# Patient Record
Sex: FEMALE | Race: WHITE | Employment: FULL TIME | URBAN - METROPOLITAN AREA
[De-identification: names, ages, dates, MRNs, and addresses within clinical notes are randomized per-mention and may not be internally consistent; named-entity substitution may affect disease eponyms.]

---

## 2017-06-27 LAB
A/G RATIO: 1.2 (ref 1.1–2.2)
ALBUMIN SERPL-MCNC: 3.8 G/DL (ref 3.4–5)
ALP BLD-CCNC: 79 U/L (ref 40–129)
ALT SERPL-CCNC: 17 U/L (ref 10–40)
ANION GAP SERPL CALCULATED.3IONS-SCNC: 16 MMOL/L (ref 3–16)
AST SERPL-CCNC: 18 U/L (ref 15–37)
BILIRUB SERPL-MCNC: <0.2 MG/DL (ref 0–1)
BUN BLDV-MCNC: 11 MG/DL (ref 7–20)
CALCIUM SERPL-MCNC: 9.5 MG/DL (ref 8.3–10.6)
CHLORIDE BLD-SCNC: 103 MMOL/L (ref 99–110)
CHOLESTEROL, TOTAL: 170 MG/DL (ref 0–199)
CO2: 23 MMOL/L (ref 21–32)
CREAT SERPL-MCNC: 0.8 MG/DL (ref 0.6–1.1)
GFR AFRICAN AMERICAN: >60
GFR NON-AFRICAN AMERICAN: >60
GLOBULIN: 3.2 G/DL
GLUCOSE BLD-MCNC: 88 MG/DL (ref 70–99)
HDLC SERPL-MCNC: 55 MG/DL (ref 40–60)
LDL CHOLESTEROL CALCULATED: 101 MG/DL
POTASSIUM SERPL-SCNC: 4.9 MMOL/L (ref 3.5–5.1)
SODIUM BLD-SCNC: 142 MMOL/L (ref 136–145)
TOTAL PROTEIN: 7 G/DL (ref 6.4–8.2)
TRIGL SERPL-MCNC: 71 MG/DL (ref 0–150)
TSH SERPL DL<=0.05 MIU/L-ACNC: 8.01 UIU/ML (ref 0.27–4.2)
VITAMIN D 25-HYDROXY: 15.6 NG/ML
VLDLC SERPL CALC-MCNC: 14 MG/DL

## 2017-06-28 LAB
ESTIMATED AVERAGE GLUCOSE: 102.5 MG/DL
HBA1C MFR BLD: 5.2 %

## 2017-08-08 ENCOUNTER — OFFICE VISIT (OUTPATIENT)
Dept: FAMILY MEDICINE CLINIC | Age: 27
End: 2017-08-08

## 2017-08-08 VITALS
HEIGHT: 67 IN | WEIGHT: 293 LBS | SYSTOLIC BLOOD PRESSURE: 116 MMHG | DIASTOLIC BLOOD PRESSURE: 74 MMHG | HEART RATE: 84 BPM | OXYGEN SATURATION: 98 % | RESPIRATION RATE: 14 BRPM | BODY MASS INDEX: 45.99 KG/M2

## 2017-08-08 DIAGNOSIS — E66.01 OBESITY, MORBID, BMI 50 OR HIGHER (HCC): ICD-10-CM

## 2017-08-08 DIAGNOSIS — Z01.818 PRE-OPERATIVE CLEARANCE: Primary | ICD-10-CM

## 2017-08-08 DIAGNOSIS — N92.1 MENORRHAGIA WITH IRREGULAR CYCLE: ICD-10-CM

## 2017-08-08 DIAGNOSIS — F41.9 ANXIETY: ICD-10-CM

## 2017-08-08 DIAGNOSIS — E03.9 ACQUIRED HYPOTHYROIDISM: ICD-10-CM

## 2017-08-08 PROCEDURE — 99204 OFFICE O/P NEW MOD 45 MIN: CPT | Performed by: FAMILY MEDICINE

## 2017-08-08 RX ORDER — BUSPIRONE HYDROCHLORIDE 10 MG/1
10 TABLET ORAL 3 TIMES DAILY
Qty: 90 TABLET | Refills: 0 | Status: SHIPPED | OUTPATIENT
Start: 2017-08-08 | End: 2017-09-26

## 2017-08-08 ASSESSMENT — PATIENT HEALTH QUESTIONNAIRE - PHQ9
SUM OF ALL RESPONSES TO PHQ QUESTIONS 1-9: 4
SUM OF ALL RESPONSES TO PHQ9 QUESTIONS 1 & 2: 4
1. LITTLE INTEREST OR PLEASURE IN DOING THINGS: 1
2. FEELING DOWN, DEPRESSED OR HOPELESS: 3

## 2017-08-14 DIAGNOSIS — E03.9 ACQUIRED HYPOTHYROIDISM: ICD-10-CM

## 2017-08-14 DIAGNOSIS — N92.1 MENORRHAGIA WITH IRREGULAR CYCLE: ICD-10-CM

## 2017-08-14 LAB
BASOPHILS ABSOLUTE: 0.1 K/UL (ref 0–0.2)
BASOPHILS RELATIVE PERCENT: 0.7 %
EOSINOPHILS ABSOLUTE: 0.2 K/UL (ref 0–0.6)
EOSINOPHILS RELATIVE PERCENT: 3.1 %
HCT VFR BLD CALC: 35.2 % (ref 36–48)
HEMOGLOBIN: 11.5 G/DL (ref 12–16)
LYMPHOCYTES ABSOLUTE: 2 K/UL (ref 1–5.1)
LYMPHOCYTES RELATIVE PERCENT: 26.7 %
MCH RBC QN AUTO: 28 PG (ref 26–34)
MCHC RBC AUTO-ENTMCNC: 32.7 G/DL (ref 31–36)
MCV RBC AUTO: 85.6 FL (ref 80–100)
MONOCYTES ABSOLUTE: 0.4 K/UL (ref 0–1.3)
MONOCYTES RELATIVE PERCENT: 5.7 %
NEUTROPHILS ABSOLUTE: 4.7 K/UL (ref 1.7–7.7)
NEUTROPHILS RELATIVE PERCENT: 63.8 %
PDW BLD-RTO: 14.3 % (ref 12.4–15.4)
PLATELET # BLD: 257 K/UL (ref 135–450)
PMV BLD AUTO: 8.9 FL (ref 5–10.5)
RBC # BLD: 4.11 M/UL (ref 4–5.2)
TSH SERPL DL<=0.05 MIU/L-ACNC: 3.31 UIU/ML (ref 0.27–4.2)
WBC # BLD: 7.4 K/UL (ref 4–11)

## 2017-08-18 ENCOUNTER — HOSPITAL ENCOUNTER (OUTPATIENT)
Dept: SURGERY | Age: 27
Discharge: HOME | End: 2017-08-18
Attending: OBSTETRICS & GYNECOLOGY | Admitting: OBSTETRICS & GYNECOLOGY

## 2017-08-18 VITALS
TEMPERATURE: 97.4 F | HEART RATE: 71 BPM | OXYGEN SATURATION: 97 % | SYSTOLIC BLOOD PRESSURE: 158 MMHG | RESPIRATION RATE: 14 BRPM | HEIGHT: 67 IN | WEIGHT: 293 LBS | BODY MASS INDEX: 45.99 KG/M2 | DIASTOLIC BLOOD PRESSURE: 85 MMHG

## 2017-08-18 DIAGNOSIS — N93.9 ABNORMAL UTERINE BLEEDING: ICD-10-CM

## 2017-08-18 LAB
A/G RATIO: 1.3 (ref 1.1–2.2)
ALBUMIN SERPL-MCNC: 4 G/DL (ref 3.4–5)
ALP BLD-CCNC: 76 U/L (ref 40–129)
ALT SERPL-CCNC: 15 U/L (ref 10–40)
ANION GAP SERPL CALCULATED.3IONS-SCNC: 12 MMOL/L (ref 3–16)
AST SERPL-CCNC: 15 U/L (ref 15–37)
BILIRUB SERPL-MCNC: <0.2 MG/DL (ref 0–1)
BUN BLDV-MCNC: 11 MG/DL (ref 7–20)
CALCIUM SERPL-MCNC: 8.6 MG/DL (ref 8.3–10.6)
CHLORIDE BLD-SCNC: 107 MMOL/L (ref 99–110)
CO2: 22 MMOL/L (ref 21–32)
CREAT SERPL-MCNC: 0.8 MG/DL (ref 0.6–1.1)
EKG ATRIAL RATE: 65 BPM
EKG DIAGNOSIS: NORMAL
EKG P AXIS: 7 DEGREES
EKG P-R INTERVAL: 164 MS
EKG Q-T INTERVAL: 412 MS
EKG QRS DURATION: 96 MS
EKG QTC CALCULATION (BAZETT): 428 MS
EKG R AXIS: 63 DEGREES
EKG T AXIS: 37 DEGREES
EKG VENTRICULAR RATE: 65 BPM
GFR AFRICAN AMERICAN: >60
GFR NON-AFRICAN AMERICAN: >60
GLOBULIN: 3 G/DL
GLUCOSE BLD-MCNC: 91 MG/DL (ref 70–99)
HCG(URINE) PREGNANCY TEST: NEGATIVE
POTASSIUM SERPL-SCNC: 4 MMOL/L (ref 3.5–5.1)
SODIUM BLD-SCNC: 141 MMOL/L (ref 136–145)
TOTAL PROTEIN: 7 G/DL (ref 6.4–8.2)

## 2017-08-18 PROCEDURE — 93010 ELECTROCARDIOGRAM REPORT: CPT | Performed by: INTERNAL MEDICINE

## 2017-08-18 RX ORDER — LIDOCAINE HYDROCHLORIDE 10 MG/ML
0.5 INJECTION, SOLUTION EPIDURAL; INFILTRATION; INTRACAUDAL; PERINEURAL ONCE
Status: DISCONTINUED | OUTPATIENT
Start: 2017-08-18 | End: 2017-08-19 | Stop reason: HOSPADM

## 2017-08-18 RX ORDER — ONDANSETRON 2 MG/ML
4 INJECTION INTRAMUSCULAR; INTRAVENOUS
Status: ACTIVE | OUTPATIENT
Start: 2017-08-18 | End: 2017-08-18

## 2017-08-18 RX ORDER — SODIUM CHLORIDE 0.9 % (FLUSH) 0.9 %
10 SYRINGE (ML) INJECTION EVERY 12 HOURS SCHEDULED
Status: DISCONTINUED | OUTPATIENT
Start: 2017-08-18 | End: 2017-08-19 | Stop reason: HOSPADM

## 2017-08-18 RX ORDER — LABETALOL HYDROCHLORIDE 5 MG/ML
5 INJECTION, SOLUTION INTRAVENOUS EVERY 10 MIN PRN
Status: DISCONTINUED | OUTPATIENT
Start: 2017-08-18 | End: 2017-08-19 | Stop reason: HOSPADM

## 2017-08-18 RX ORDER — FENTANYL CITRATE 50 UG/ML
25 INJECTION, SOLUTION INTRAMUSCULAR; INTRAVENOUS EVERY 5 MIN PRN
Status: DISCONTINUED | OUTPATIENT
Start: 2017-08-18 | End: 2017-08-19 | Stop reason: HOSPADM

## 2017-08-18 RX ORDER — HYDROMORPHONE HCL 110MG/55ML
0.25 PATIENT CONTROLLED ANALGESIA SYRINGE INTRAVENOUS EVERY 5 MIN PRN
Status: DISCONTINUED | OUTPATIENT
Start: 2017-08-18 | End: 2017-08-19 | Stop reason: HOSPADM

## 2017-08-18 RX ORDER — LIDOCAINE HYDROCHLORIDE 10 MG/ML
1 INJECTION, SOLUTION EPIDURAL; INFILTRATION; INTRACAUDAL; PERINEURAL
Status: ACTIVE | OUTPATIENT
Start: 2017-08-18 | End: 2017-08-18

## 2017-08-18 RX ORDER — FENTANYL CITRATE 50 UG/ML
50 INJECTION, SOLUTION INTRAMUSCULAR; INTRAVENOUS EVERY 5 MIN PRN
Status: DISCONTINUED | OUTPATIENT
Start: 2017-08-18 | End: 2017-08-19 | Stop reason: HOSPADM

## 2017-08-18 RX ORDER — KETOROLAC TROMETHAMINE 30 MG/ML
INJECTION, SOLUTION INTRAMUSCULAR; INTRAVENOUS
Status: COMPLETED
Start: 2017-08-18 | End: 2017-08-18

## 2017-08-18 RX ORDER — OXYCODONE HYDROCHLORIDE AND ACETAMINOPHEN 5; 325 MG/1; MG/1
1 TABLET ORAL PRN
Status: ACTIVE | OUTPATIENT
Start: 2017-08-18 | End: 2017-08-18

## 2017-08-18 RX ORDER — KETOROLAC TROMETHAMINE 30 MG/ML
30 INJECTION, SOLUTION INTRAMUSCULAR; INTRAVENOUS ONCE
Status: COMPLETED | OUTPATIENT
Start: 2017-08-18 | End: 2017-08-18

## 2017-08-18 RX ORDER — OXYCODONE HYDROCHLORIDE AND ACETAMINOPHEN 5; 325 MG/1; MG/1
2 TABLET ORAL PRN
Status: ACTIVE | OUTPATIENT
Start: 2017-08-18 | End: 2017-08-18

## 2017-08-18 RX ORDER — SODIUM CHLORIDE 9 MG/ML
INJECTION, SOLUTION INTRAVENOUS CONTINUOUS
Status: DISCONTINUED | OUTPATIENT
Start: 2017-08-18 | End: 2017-08-19 | Stop reason: HOSPADM

## 2017-08-18 RX ORDER — IBUPROFEN 600 MG/1
600 TABLET ORAL EVERY 6 HOURS PRN
Qty: 30 TABLET | Refills: 1 | Status: SHIPPED | OUTPATIENT
Start: 2017-08-18 | End: 2020-05-19

## 2017-08-18 RX ORDER — SODIUM CHLORIDE, SODIUM LACTATE, POTASSIUM CHLORIDE, CALCIUM CHLORIDE 600; 310; 30; 20 MG/100ML; MG/100ML; MG/100ML; MG/100ML
INJECTION, SOLUTION INTRAVENOUS CONTINUOUS
Status: DISCONTINUED | OUTPATIENT
Start: 2017-08-18 | End: 2017-08-19 | Stop reason: HOSPADM

## 2017-08-18 RX ORDER — HYDROMORPHONE HCL 110MG/55ML
0.5 PATIENT CONTROLLED ANALGESIA SYRINGE INTRAVENOUS EVERY 5 MIN PRN
Status: DISCONTINUED | OUTPATIENT
Start: 2017-08-18 | End: 2017-08-19 | Stop reason: HOSPADM

## 2017-08-18 RX ORDER — MEPERIDINE HYDROCHLORIDE 25 MG/ML
12.5 INJECTION INTRAMUSCULAR; INTRAVENOUS; SUBCUTANEOUS EVERY 5 MIN PRN
Status: DISCONTINUED | OUTPATIENT
Start: 2017-08-18 | End: 2017-08-19 | Stop reason: HOSPADM

## 2017-08-18 RX ORDER — SODIUM CHLORIDE 0.9 % (FLUSH) 0.9 %
10 SYRINGE (ML) INJECTION PRN
Status: DISCONTINUED | OUTPATIENT
Start: 2017-08-18 | End: 2017-08-19 | Stop reason: HOSPADM

## 2017-08-18 RX ADMIN — KETOROLAC TROMETHAMINE 30 MG: 30 INJECTION, SOLUTION INTRAMUSCULAR; INTRAVENOUS at 10:50

## 2017-08-18 RX ADMIN — SODIUM CHLORIDE, SODIUM LACTATE, POTASSIUM CHLORIDE, CALCIUM CHLORIDE: 600; 310; 30; 20 INJECTION, SOLUTION INTRAVENOUS at 08:49

## 2017-08-18 RX ADMIN — SODIUM CHLORIDE, SODIUM LACTATE, POTASSIUM CHLORIDE, CALCIUM CHLORIDE: 600; 310; 30; 20 INJECTION, SOLUTION INTRAVENOUS at 10:47

## 2017-08-18 RX ADMIN — FENTANYL CITRATE 50 MCG: 50 INJECTION, SOLUTION INTRAMUSCULAR; INTRAVENOUS at 10:40

## 2017-08-18 RX ADMIN — FENTANYL CITRATE 50 MCG: 50 INJECTION, SOLUTION INTRAMUSCULAR; INTRAVENOUS at 10:30

## 2017-08-18 ASSESSMENT — PAIN SCALES - GENERAL
PAINLEVEL_OUTOF10: 8
PAINLEVEL_OUTOF10: 8
PAINLEVEL_OUTOF10: 1

## 2017-08-18 ASSESSMENT — PAIN DESCRIPTION - PAIN TYPE
TYPE: SURGICAL PAIN

## 2017-08-18 ASSESSMENT — PAIN - FUNCTIONAL ASSESSMENT: PAIN_FUNCTIONAL_ASSESSMENT: 0-10

## 2017-09-26 ENCOUNTER — OFFICE VISIT (OUTPATIENT)
Dept: FAMILY MEDICINE CLINIC | Age: 27
End: 2017-09-26

## 2017-09-26 VITALS
BODY MASS INDEX: 45.99 KG/M2 | WEIGHT: 293 LBS | RESPIRATION RATE: 14 BRPM | HEART RATE: 82 BPM | DIASTOLIC BLOOD PRESSURE: 66 MMHG | HEIGHT: 67 IN | SYSTOLIC BLOOD PRESSURE: 112 MMHG | OXYGEN SATURATION: 98 %

## 2017-09-26 DIAGNOSIS — E03.9 ACQUIRED HYPOTHYROIDISM: ICD-10-CM

## 2017-09-26 DIAGNOSIS — F41.9 ANXIETY: Primary | ICD-10-CM

## 2017-09-26 PROCEDURE — 99213 OFFICE O/P EST LOW 20 MIN: CPT | Performed by: FAMILY MEDICINE

## 2017-09-26 RX ORDER — VENLAFAXINE HYDROCHLORIDE 37.5 MG/1
37.5 CAPSULE, EXTENDED RELEASE ORAL DAILY
Qty: 30 CAPSULE | Refills: 3 | Status: SHIPPED | OUTPATIENT
Start: 2017-09-26 | End: 2020-05-19

## 2017-09-26 RX ORDER — LEVOTHYROXINE SODIUM 0.05 MG/1
50 TABLET ORAL DAILY
Qty: 90 TABLET | Refills: 3 | Status: SHIPPED | OUTPATIENT
Start: 2017-09-26 | End: 2020-05-19

## 2017-11-03 ENCOUNTER — OFFICE VISIT (OUTPATIENT)
Dept: FAMILY MEDICINE CLINIC | Age: 27
End: 2017-11-03

## 2017-11-03 VITALS
HEART RATE: 97 BPM | WEIGHT: 293 LBS | OXYGEN SATURATION: 98 % | BODY MASS INDEX: 45.99 KG/M2 | HEIGHT: 67 IN | SYSTOLIC BLOOD PRESSURE: 124 MMHG | RESPIRATION RATE: 14 BRPM | DIASTOLIC BLOOD PRESSURE: 84 MMHG

## 2017-11-03 DIAGNOSIS — N39.3 STRESS INCONTINENCE OF URINE: Primary | ICD-10-CM

## 2017-11-03 DIAGNOSIS — J06.9 VIRAL URI WITH COUGH: ICD-10-CM

## 2017-11-03 DIAGNOSIS — R10.32 LLQ CRAMPING: ICD-10-CM

## 2017-11-03 PROBLEM — Z83.49 FAMILY HISTORY OF CYSTIC FIBROSIS: Status: ACTIVE | Noted: 2017-11-03

## 2017-11-03 LAB
BILIRUBIN, POC: NORMAL
BLOOD URINE, POC: NORMAL
CLARITY, POC: CLEAR
COLOR, POC: YELLOW
GLUCOSE URINE, POC: NORMAL
KETONES, POC: NORMAL
LEUKOCYTE EST, POC: NORMAL
NITRITE, POC: NORMAL
PH, POC: 6
PROTEIN, POC: NORMAL
SPECIFIC GRAVITY, POC: 1.03
UROBILINOGEN, POC: 0.2

## 2017-11-03 PROCEDURE — G8484 FLU IMMUNIZE NO ADMIN: HCPCS | Performed by: FAMILY MEDICINE

## 2017-11-03 PROCEDURE — G8427 DOCREV CUR MEDS BY ELIG CLIN: HCPCS | Performed by: FAMILY MEDICINE

## 2017-11-03 PROCEDURE — 4004F PT TOBACCO SCREEN RCVD TLK: CPT | Performed by: FAMILY MEDICINE

## 2017-11-03 PROCEDURE — G8417 CALC BMI ABV UP PARAM F/U: HCPCS | Performed by: FAMILY MEDICINE

## 2017-11-03 PROCEDURE — 99214 OFFICE O/P EST MOD 30 MIN: CPT | Performed by: FAMILY MEDICINE

## 2017-11-03 PROCEDURE — 81002 URINALYSIS NONAUTO W/O SCOPE: CPT | Performed by: FAMILY MEDICINE

## 2017-11-03 RX ORDER — PROMETHAZINE HYDROCHLORIDE AND CODEINE PHOSPHATE 6.25; 1 MG/5ML; MG/5ML
5 SYRUP ORAL EVERY 4 HOURS PRN
Qty: 100 ML | Refills: 0 | Status: SHIPPED | OUTPATIENT
Start: 2017-11-03 | End: 2017-11-10

## 2017-11-03 RX ORDER — NAPROXEN 500 MG/1
500 TABLET ORAL 2 TIMES DAILY WITH MEALS
Qty: 30 TABLET | Refills: 0 | Status: SHIPPED | OUTPATIENT
Start: 2017-11-03 | End: 2017-11-15 | Stop reason: SDUPTHER

## 2017-11-03 NOTE — PROGRESS NOTES
Chief Complaint: Urinary Frequency (urinary leakage. every time she coughs, or sneezes, sometimes when she stands up she will leak. R side pain. )       HPI:  Monica Ugalde is a 33 y.o. female here with c/o urinary leakage every time she coughed. Last night she wet herself. She has been having pain in her left lower quadrant. She was concerned if she had any urinary tract infection. She denies any dysuria. She had D&C recently. Following which she had IUD placed 2 weeks ago. She still have spotting. She's been having congestion and cough. The cough is dry. Her daughter was sick with similar symptoms. She was diagnosed with strep throat. She denies any sore throat. Had congestion and cough. Cough is nonproductive. ROS:  Constitutional: Negative for appetite change, fatigue, fever and unexpected weight change. HENT: As mentioned above. Eyes: Negative for photophobia, discharge, redness and visual disturbance. Respiratory: Negative for cough, chest tightness, shortness of breath and wheezing. Cardiovascular: Negative for chest pain, palpitations and leg swelling. Gastrointestinal: As mentioned above. .    Genitourinary: As mentioned above. Skin: Negative for color change, pallor, rash and wound. Neurological: Negative for dizziness, tremors, seizures, syncope, facial asymmetry, speech difficulty, weakness, light-headedness, numbness and headaches. Patient's problem list, medications, allergies, past medical, surgical, social and family histories were reviewed and updated as appropriate.      Current Outpatient Prescriptions   Medication Sig Dispense Refill    promethazine-codeine (PHENERGAN WITH CODEINE) 6.25-10 MG/5ML syrup Take 5 mLs by mouth every 4 hours as needed for Cough 100 mL 0    naproxen (NAPROSYN) 500 MG tablet Take 1 tablet by mouth 2 times daily (with meals) 30 tablet 0    venlafaxine (EFFEXOR XR) 37.5 MG extended release capsule Take 1 capsule by mouth daily 30 capsule 3    levothyroxine (SYNTHROID) 50 MCG tablet Take 1 tablet by mouth daily 90 tablet 3    ibuprofen (ADVIL;MOTRIN) 600 MG tablet Take 1 tablet by mouth every 6 hours as needed for Pain 30 tablet 1    naproxen (NAPROSYN) 500 MG tablet Take 1 tablet by mouth 2 times daily for 20 doses. 20 tablet 0     No current facility-administered medications for this visit. Social History   Substance Use Topics    Smoking status: Current Every Day Smoker     Packs/day: 1.00     Years: 9.00     Types: Cigarettes     Start date: 4/8/2017    Smokeless tobacco: Never Used    Alcohol use No        Objective:     Vitals:    11/03/17 1143   BP: 124/84   Site: Left Arm   Position: Sitting   Cuff Size: Large Adult   Pulse: 97   Resp: 14   SpO2: 98%   Weight: (!) 316 lb 3.2 oz (143.4 kg)   Height: 5' 7\" (1.702 m)     Body mass index is 49.52 kg/m². Wt Readings from Last 3 Encounters:   11/03/17 (!) 316 lb 3.2 oz (143.4 kg)   09/26/17 (!) 323 lb 3.2 oz (146.6 kg)   08/18/17 (!) 319 lb 0.1 oz (144.7 kg)     BP Readings from Last 3 Encounters:   11/03/17 124/84   09/26/17 112/66   08/18/17 (!) 158/85       Physical exam:  Constitutional: she is oriented to person, place, and time. she appears well-developed and well-nourished. No distress. HENT:   Head: Normocephalic. Right Ear: External ear normal. Normal TM   Left Ear: External ear normal. Normal TM  Nose: congested. Mouth/Throat: Oropharynx is clear and moist. No oropharyngeal exudate. Eyes: Conjunctivae and EOM are normal. Pupils are equal, round, and reactive to light. Right eye exhibits no discharge. Left eye exhibits no discharge. No scleral icterus. Neck: Normal range of motion. No JVD present. No tracheal deviation present. No thyromegaly present. Cardiovascular: Normal rate, regular rhythm, normal heart sounds and intact distal pulses. No murmur heard. Pulmonary/Chest: Effort normal and breath sounds normal. No stridor. No respiratory distress.  she has no wheezes. she has no rales. sheexhibits no tenderness. Abdominal: Soft. Bowel sounds are normal. she exhibits no distension and no mass. There is mild discomfort on her left lower quadrant and noted no costo phrenic angle tenderness. There is no rebound and no guarding. Neurological:she is alert and oriented to person, place, and time. she has gross neurological exam normal with normal strength and normal gait  Skin: Skin is warm and dry. No rash noted. she is not diaphoretic. No erythema. No pallor. Assessment/Plan:   1. Urinary leakage  Urine dipstick is acute for blood and negative for leukocytes or nitrates  Her urine leakage could have perhaps secondary to coughing and over weight  Advised about kegel exercise  - POCT Urinalysis no Micro    2. LLQ cramping  Could be secondary to IUD  - naproxen (NAPROSYN) 500 MG tablet; Take 1 tablet by mouth 2 times daily (with meals)  Dispense: 30 tablet; Refill: 0    3. Viral URI with cough  Symtomatic treatment for the URI symptoms: Tylenol / Advil, salt water gargles, increase fluids. Can make appointment of symptoms worsen or fail to improve over the next 3-4 days. Most viral illnesses last 7-10 days, and antibiotics do not help. Symptomatic treatment with  - promethazine-codeine (PHENERGAN WITH CODEINE) 6.25-10 MG/5ML syrup; Take 5 mLs by mouth every 4 hours as needed for Cough  Dispense: 100 mL; Refill: 0       No Follow-up on file.       Ludy Carpenter  11/3/2017 12:15 PM

## 2017-11-06 ENCOUNTER — TELEPHONE (OUTPATIENT)
Dept: FAMILY MEDICINE CLINIC | Age: 27
End: 2017-11-06

## 2017-11-06 ENCOUNTER — OFFICE VISIT (OUTPATIENT)
Dept: FAMILY MEDICINE CLINIC | Age: 27
End: 2017-11-06

## 2017-11-06 VITALS
DIASTOLIC BLOOD PRESSURE: 66 MMHG | WEIGHT: 293 LBS | SYSTOLIC BLOOD PRESSURE: 104 MMHG | RESPIRATION RATE: 14 BRPM | HEIGHT: 67 IN | HEART RATE: 83 BPM | BODY MASS INDEX: 45.99 KG/M2 | OXYGEN SATURATION: 98 %

## 2017-11-06 PROCEDURE — G8484 FLU IMMUNIZE NO ADMIN: HCPCS | Performed by: FAMILY MEDICINE

## 2017-11-06 PROCEDURE — G8417 CALC BMI ABV UP PARAM F/U: HCPCS | Performed by: FAMILY MEDICINE

## 2017-11-06 PROCEDURE — G8427 DOCREV CUR MEDS BY ELIG CLIN: HCPCS | Performed by: FAMILY MEDICINE

## 2017-11-06 PROCEDURE — 99213 OFFICE O/P EST LOW 20 MIN: CPT | Performed by: FAMILY MEDICINE

## 2017-11-06 PROCEDURE — 4004F PT TOBACCO SCREEN RCVD TLK: CPT | Performed by: FAMILY MEDICINE

## 2017-11-06 RX ORDER — BENZONATATE 100 MG/1
100 CAPSULE ORAL 2 TIMES DAILY PRN
Qty: 20 CAPSULE | Refills: 0 | Status: SHIPPED | OUTPATIENT
Start: 2017-11-06 | End: 2017-11-13

## 2017-11-06 RX ORDER — FERROUS SULFATE 325(65) MG
325 TABLET ORAL
Qty: 30 TABLET | Refills: 3 | Status: SHIPPED | OUTPATIENT
Start: 2017-11-06 | End: 2020-05-19

## 2017-11-15 DIAGNOSIS — R10.32 LLQ CRAMPING: ICD-10-CM

## 2017-11-16 RX ORDER — NAPROXEN 500 MG/1
TABLET ORAL
Qty: 30 TABLET | Refills: 0 | Status: SHIPPED | OUTPATIENT
Start: 2017-11-16 | End: 2017-12-12 | Stop reason: ALTCHOICE

## 2017-11-28 ENCOUNTER — TELEPHONE (OUTPATIENT)
Dept: BARIATRICS/WEIGHT MGMT | Age: 27
End: 2017-11-28

## 2017-12-12 ENCOUNTER — OFFICE VISIT (OUTPATIENT)
Dept: BARIATRICS/WEIGHT MGMT | Age: 27
End: 2017-12-12

## 2017-12-12 VITALS
DIASTOLIC BLOOD PRESSURE: 72 MMHG | HEART RATE: 82 BPM | BODY MASS INDEX: 45.99 KG/M2 | WEIGHT: 293 LBS | HEIGHT: 67 IN | SYSTOLIC BLOOD PRESSURE: 108 MMHG

## 2017-12-12 DIAGNOSIS — K21.9 CHRONIC GERD: ICD-10-CM

## 2017-12-12 DIAGNOSIS — Z01.818 PRE-OPERATIVE CLEARANCE: ICD-10-CM

## 2017-12-12 DIAGNOSIS — E66.01 OBESITY, MORBID, BMI 50 OR HIGHER (HCC): Primary | ICD-10-CM

## 2017-12-12 DIAGNOSIS — E28.2 PCOS (POLYCYSTIC OVARIAN SYNDROME): ICD-10-CM

## 2017-12-12 DIAGNOSIS — R06.83 SNORING: ICD-10-CM

## 2017-12-12 PROCEDURE — G8427 DOCREV CUR MEDS BY ELIG CLIN: HCPCS | Performed by: SURGERY

## 2017-12-12 PROCEDURE — 99205 OFFICE O/P NEW HI 60 MIN: CPT | Performed by: SURGERY

## 2017-12-12 PROCEDURE — G8417 CALC BMI ABV UP PARAM F/U: HCPCS | Performed by: SURGERY

## 2017-12-12 PROCEDURE — 4004F PT TOBACCO SCREEN RCVD TLK: CPT | Performed by: SURGERY

## 2017-12-12 PROCEDURE — G8484 FLU IMMUNIZE NO ADMIN: HCPCS | Performed by: SURGERY

## 2017-12-12 NOTE — PROGRESS NOTES
lot, takes the stairs. Surgery  Patient's greatest concern about having surgery is: death. Patient describes the motivation for weight loss surgery to be: my kids- wants to be healthy for them. Patient does feel confident in her ability to make these changes. The patient's expectations of post-surgical eating habits realistic. Patient states she does understand the consequences of not complying with post-op food guidelines. Patient states she understands the long term changes in food intake that will be necessary for all occasions after surgery for the rest of her life. she does understand the long term food changes. Patient is deemed nutritionally appropriate to proceed. Goals  Weight: 200 lbs or less   Health Improvement: wants her back to stop hurting, wants to breathe better, improve health for the kid, wants to decrease anxiety and depression     Assessment  Nutritional Needs: RMR=(9.99 x 146.2) + (6.25 x 170.2) - (4.92 x 27 y.o.) -161= 2236 kcal x 1.4 (sedentary activity factor)= 3130 kcal - 1000 (for 2 lb weight loss/week)= 2130 kcal.    Plan  Surgical procedure desired: gastric bypass    Plan/Recommendations: Surgical Procedure: bypass or medical weight loss     Goals:   -Eat 4-5 times daily  -Avoid high fat and high sugar foods  -Include protein with all meals and snacks  -Avoid carbonation and caffeine  -Avoid calorie containing beverages  -Increase physical activity as tolerated    PES Statement:  Overweight/Obesity related to lack of exercise, sedentary lifestyle, unhealthy eating habits, and unsuccessful diet attempts as evidenced by BMI. Body mass index is 50.49 kg/m². .    Will follow up as necessary.     Lissy Ulloa

## 2017-12-12 NOTE — PROGRESS NOTES
overweight  Breast ROS: negative  Respiratory ROS: negative  Cardiovascular ROS: negative  Gastrointestinal ROS:negative  Genito-Urinary ROS: negative  Musculoskeletal ROS: negative   Skin ROS: negative    Physical Exam   Constitutional: Patient is oriented to person, place, and time. Vital signs are normal. Patient  appears well-developed and well-nourished. Patient  is active and cooperative. Non-toxic appearance. No distress. HENT:   Head: Normocephalic and atraumatic. Head is without laceration. Right Ear: External ear normal. No lacerations. No drainage, swelling or tenderness. Left Ear: External ear normal. No lacerations. No drainage, swelling or tenderness. Nose: Nose normal. No nose lacerations or nasal deformity. Mouth/Throat: Uvula is midline, oropharynx is clear and moist and mucous membranes are normal. No oropharyngeal exudate. Eyes: Conjunctivae, EOM and lids are normal. Pupils are equal, round, and reactive to light. Right eye exhibits no discharge. No foreign body present in the right eye. Left eye exhibits no discharge. No foreign body present in the left eye. No scleral icterus. Neck: Trachea normal and normal range of motion. Neck supple. No JVD present. No tracheal tenderness present. Carotid bruit is not present. No rigidity. No tracheal deviation and no edema present. No thyromegaly present. Cardiovascular: Normal rate, regular rhythm, normal heart sounds, intact distal pulses and normal pulses. Pulmonary/Chest: Effort normal and breath sounds normal. No stridor. No respiratory distress. Patient  has no wheezes. Patient has no rales. Patient exhibits no tenderness and no crepitus. Abdominal: Soft. Normal appearance and bowel sounds are normal. Patient exhibits no distension, no abdominal bruit, no ascites and no mass. There is no hepatosplenomegaly. There is no tenderness. There is no rigidity, no rebound, no guarding and no CVA tenderness. No hernia.  Hernia confirmed negative in the ventral area. Musculoskeletal: Normal range of motion. Patient exhibits no edema or tenderness. Lymphadenopathy:        Head (right side): No submental, no submandibular, no preauricular, no posterior auricular and no occipital adenopathy present. Head (left side): No submental, no submandibular, no preauricular, no posterior auricular and no occipital adenopathy present. Patient  has no cervical adenopathy. Right: No supraclavicular adenopathy present. Left: No supraclavicular adenopathy present. Neurological: Patient is alert and oriented to person, place, and time. Patient has normal strength. Coordination and gait normal. GCS eye subscore is 4. GCS verbal subscore is 5. GCS motor subscore is 6. Skin: Skin is warm and dry. No abrasion and no rash noted. Patient  is not diaphoretic. No cyanosis or erythema. Psychiatric: Patient has a normal mood and affect. speech is normal and behavior is normal. Cognition and memory are normal.         Juany Dominguez was seen today for bariatric, initial visit. Diagnoses and all orders for this visit:    Obesity, morbid, BMI 50 or higher ([facility] [address]  -     CBC with Differential; Future  -     Comprehensive Metabolic Panel; Future  -     Lipid Panel; Future  -     TSH with Reflex; Future  -     Vitamin B12 & Folate; Future  -     Iron and TIBC; Future  -     Vitamin D 25 Hydroxy; Future  -     Hemoglobin A1C; Future  -     US Gallbladder Ruq; Future  -     Ambulatory referral to Sleep Medicine    Pre-operative clearance  -     CBC with Differential; Future  -     Comprehensive Metabolic Panel; Future  -     Lipid Panel; Future  -     TSH with Reflex; Future  -     Vitamin B12 & Folate; Future  -     Iron and TIBC; Future  -     Vitamin D 25 Hydroxy; Future  -     Hemoglobin A1C; Future  -     US Gallbladder Ruq;  Future  -     Ambulatory referral to Sleep Medicine    PCOS (polycystic ovarian syndrome)  -     CBC with Differential; Future  -

## 2017-12-14 ENCOUNTER — TELEPHONE (OUTPATIENT)
Dept: PULMONOLOGY | Age: 27
End: 2017-12-14

## 2017-12-14 ENCOUNTER — OFFICE VISIT (OUTPATIENT)
Dept: PSYCHOLOGY | Age: 27
End: 2017-12-14

## 2017-12-14 DIAGNOSIS — F32.2 SEVERE SINGLE CURRENT EPISODE OF MAJOR DEPRESSIVE DISORDER, WITHOUT PSYCHOTIC FEATURES (HCC): Primary | ICD-10-CM

## 2017-12-14 DIAGNOSIS — F41.0 PANIC DISORDER WITHOUT AGORAPHOBIA: ICD-10-CM

## 2017-12-14 PROCEDURE — 90791 PSYCH DIAGNOSTIC EVALUATION: CPT | Performed by: PSYCHOLOGIST

## 2017-12-14 ASSESSMENT — PATIENT HEALTH QUESTIONNAIRE - PHQ9
6. FEELING BAD ABOUT YOURSELF - OR THAT YOU ARE A FAILURE OR HAVE LET YOURSELF OR YOUR FAMILY DOWN: 3
7. TROUBLE CONCENTRATING ON THINGS, SUCH AS READING THE NEWSPAPER OR WATCHING TELEVISION: 3
5. POOR APPETITE OR OVEREATING: 3
4. FEELING TIRED OR HAVING LITTLE ENERGY: 3
10. IF YOU CHECKED OFF ANY PROBLEMS, HOW DIFFICULT HAVE THESE PROBLEMS MADE IT FOR YOU TO DO YOUR WORK, TAKE CARE OF THINGS AT HOME, OR GET ALONG WITH OTHER PEOPLE: 3
SUM OF ALL RESPONSES TO PHQ9 QUESTIONS 1 & 2: 5
9. THOUGHTS THAT YOU WOULD BE BETTER OFF DEAD, OR OF HURTING YOURSELF: 0
2. FEELING DOWN, DEPRESSED OR HOPELESS: 3
SUM OF ALL RESPONSES TO PHQ QUESTIONS 1-9: 21
1. LITTLE INTEREST OR PLEASURE IN DOING THINGS: 2
8. MOVING OR SPEAKING SO SLOWLY THAT OTHER PEOPLE COULD HAVE NOTICED. OR THE OPPOSITE, BEING SO FIGETY OR RESTLESS THAT YOU HAVE BEEN MOVING AROUND A LOT MORE THAN USUAL: 1
3. TROUBLE FALLING OR STAYING ASLEEP: 3

## 2017-12-14 NOTE — PROGRESS NOTES
her.  Dad dx'd with bipolar and had a brain aneurysm. Pt has been tested for bipolar in the past and it was negative. Pt denied manic sx. Not sleeping well, partly depends on the baby. Tired. O:  MSE:    Appearance    crying  Appetite abnormal: overeats  Sleep disturbance Yes  Fatigue Yes  Loss of pleasure Yes  Impulsive behavior Yes  Speech    normal rate  Mood    Anxious  Depressed  Irritability  Affect    depressed affect  Thought Content    intact  Thought Process    coherent  Associations    logical connections  Insight    Good  Judgment    Intact  Orientation    oriented to person, place, time, and general circumstances  Memory    recent and remote memory intact  Attention/Concentration    impaired  Morbid ideation No  Suicide Assessment    no suicidal ideation      History:    Medications:   Current Outpatient Prescriptions   Medication Sig Dispense Refill    ferrous sulfate (AMANDA-FERNY) 325 (65 Fe) MG tablet Take 1 tablet by mouth daily (with breakfast) 30 tablet 3    venlafaxine (EFFEXOR XR) 37.5 MG extended release capsule Take 1 capsule by mouth daily 30 capsule 3    levothyroxine (SYNTHROID) 50 MCG tablet Take 1 tablet by mouth daily 90 tablet 3    ibuprofen (ADVIL;MOTRIN) 600 MG tablet Take 1 tablet by mouth every 6 hours as needed for Pain 30 tablet 1     No current facility-administered medications for this visit. Social History:   Social History     Social History    Marital status:      Spouse name: N/A    Number of children: N/A    Years of education: N/A     Occupational History    Not on file.      Social History Main Topics    Smoking status: Current Every Day Smoker     Packs/day: 1.00     Years: 9.00     Types: Cigarettes     Start date: 4/8/2017    Smokeless tobacco: Never Used    Alcohol use No    Drug use: No    Sexual activity: Yes     Partners: Male     Other Topics Concern    Not on file     Social History Narrative    No narrative on file

## 2017-12-15 ENCOUNTER — OFFICE VISIT (OUTPATIENT)
Dept: FAMILY MEDICINE CLINIC | Age: 27
End: 2017-12-15

## 2017-12-15 VITALS
OXYGEN SATURATION: 98 % | RESPIRATION RATE: 14 BRPM | SYSTOLIC BLOOD PRESSURE: 110 MMHG | BODY MASS INDEX: 45.99 KG/M2 | DIASTOLIC BLOOD PRESSURE: 72 MMHG | HEART RATE: 89 BPM | WEIGHT: 293 LBS | HEIGHT: 67 IN

## 2017-12-15 DIAGNOSIS — E66.01 OBESITY, MORBID, BMI 50 OR HIGHER (HCC): ICD-10-CM

## 2017-12-15 DIAGNOSIS — F41.9 ANXIETY AND DEPRESSION: Primary | ICD-10-CM

## 2017-12-15 DIAGNOSIS — F32.A ANXIETY AND DEPRESSION: Primary | ICD-10-CM

## 2017-12-15 PROCEDURE — G8417 CALC BMI ABV UP PARAM F/U: HCPCS | Performed by: FAMILY MEDICINE

## 2017-12-15 PROCEDURE — 99213 OFFICE O/P EST LOW 20 MIN: CPT | Performed by: FAMILY MEDICINE

## 2017-12-15 PROCEDURE — G8484 FLU IMMUNIZE NO ADMIN: HCPCS | Performed by: FAMILY MEDICINE

## 2017-12-15 PROCEDURE — G8427 DOCREV CUR MEDS BY ELIG CLIN: HCPCS | Performed by: FAMILY MEDICINE

## 2017-12-15 PROCEDURE — 4004F PT TOBACCO SCREEN RCVD TLK: CPT | Performed by: FAMILY MEDICINE

## 2017-12-15 NOTE — PROGRESS NOTES
Chief Complaint: Weight Management (Imo weight check. )       HPI:  Albert Aleman is a 33 y.o. female here for the follow up on weight management for bariatric surgery and also symptoms of anxiety and depression    She has not lost any weight since her last visit as she was unable to strictly follow diet. She has been exercising where she has been able to walk for more than 30 min  She denies any co morbidities from holding her gym. She wanted to do gym but however not able to do. She did she Dr Sebastián Oglesby however she still has the symptoms of depression and anxiety. She was prescribed effexor and however she has not been taking the medications. She quotes that she does not like the medications and hence she is not taking medications. She denies any increased stress. ROS:  Constitutional: Negative for appetite change, fatigue, fever and unexpected weight change. Respiratory: Negative for cough, chest tightness, shortness of breath and wheezing. Cardiovascular: Negative for chest pain, palpitations and leg swelling. Gastrointestinal: Negative for abdominal pain, blood in stool, constipation, diarrhea, nausea and vomiting. Skin: Negative for color change, pallor, rash and wound. Neurological: Negative for dizziness, tremors, seizures, syncope, facial asymmetry, speech difficulty, weakness, light-headedness, numbness and headaches. Psychiatric/Behavioral: as mentioned above     Patient's problem list, medications, allergies, past medical, surgical, social and family histories were reviewed and updated as appropriate.      Current Outpatient Prescriptions   Medication Sig Dispense Refill    ferrous sulfate (AMANDA-FERNY) 325 (65 Fe) MG tablet Take 1 tablet by mouth daily (with breakfast) 30 tablet 3    levothyroxine (SYNTHROID) 50 MCG tablet Take 1 tablet by mouth daily 90 tablet 3    ibuprofen (ADVIL;MOTRIN) 600 MG tablet Take 1 tablet by mouth every 6 hours as needed for Pain 30 tablet 1   

## 2018-01-05 ENCOUNTER — OFFICE VISIT (OUTPATIENT)
Dept: FAMILY MEDICINE CLINIC | Age: 28
End: 2018-01-05

## 2018-01-05 VITALS
OXYGEN SATURATION: 98 % | HEIGHT: 67 IN | WEIGHT: 293 LBS | DIASTOLIC BLOOD PRESSURE: 68 MMHG | SYSTOLIC BLOOD PRESSURE: 114 MMHG | RESPIRATION RATE: 15 BRPM | HEART RATE: 72 BPM | BODY MASS INDEX: 45.99 KG/M2

## 2018-01-05 DIAGNOSIS — E03.9 ACQUIRED HYPOTHYROIDISM: ICD-10-CM

## 2018-01-05 DIAGNOSIS — F41.9 ANXIETY: ICD-10-CM

## 2018-01-05 DIAGNOSIS — Z23 NEEDS FLU SHOT: ICD-10-CM

## 2018-01-05 DIAGNOSIS — Z02.89 ENCOUNTER FOR PHYSICAL EXAMINATION RELATED TO EMPLOYMENT: Primary | ICD-10-CM

## 2018-01-05 PROCEDURE — 90471 IMMUNIZATION ADMIN: CPT | Performed by: FAMILY MEDICINE

## 2018-01-05 PROCEDURE — 99395 PREV VISIT EST AGE 18-39: CPT | Performed by: FAMILY MEDICINE

## 2018-01-05 PROCEDURE — 90688 IIV4 VACCINE SPLT 0.5 ML IM: CPT | Performed by: FAMILY MEDICINE

## 2018-03-09 ENCOUNTER — HOSPITAL ENCOUNTER (OUTPATIENT)
Dept: ENDOSCOPY | Age: 28
Discharge: HOME | End: 2018-03-06
Attending: SURGERY | Admitting: SURGERY

## 2018-03-14 ENCOUNTER — TELEPHONE (OUTPATIENT)
Dept: BARIATRICS/WEIGHT MGMT | Age: 28
End: 2018-03-14

## 2018-03-14 NOTE — TELEPHONE ENCOUNTER
Spoke with patient let them know they missed appt she was aware said she would call tomorrow and reschedule.

## 2018-05-01 ENCOUNTER — TELEPHONE (OUTPATIENT)
Dept: BARIATRICS/WEIGHT MGMT | Age: 28
End: 2018-05-01

## 2018-09-18 ENCOUNTER — TELEPHONE (OUTPATIENT)
Dept: FAMILY MEDICINE CLINIC | Age: 28
End: 2018-09-18

## 2018-09-18 DIAGNOSIS — R01.1 SYSTOLIC MURMUR: Primary | ICD-10-CM

## 2018-09-18 NOTE — TELEPHONE ENCOUNTER
Called and patient said she does not have insurance and hence unable to see me  She could not offered at this point  So order echocardiogram to assess her murmur.  And she voice understanding   Please call and give her the scheduling no

## 2019-02-05 ENCOUNTER — OFFICE VISIT (OUTPATIENT)
Dept: FAMILY MEDICINE CLINIC | Age: 29
End: 2019-02-05
Payer: COMMERCIAL

## 2019-02-05 VITALS
BODY MASS INDEX: 47.09 KG/M2 | WEIGHT: 293 LBS | SYSTOLIC BLOOD PRESSURE: 110 MMHG | DIASTOLIC BLOOD PRESSURE: 70 MMHG | HEIGHT: 66 IN | OXYGEN SATURATION: 97 % | HEART RATE: 79 BPM

## 2019-02-05 DIAGNOSIS — E03.9 ACQUIRED HYPOTHYROIDISM: ICD-10-CM

## 2019-02-05 DIAGNOSIS — L91.8 MULTIPLE ACQUIRED SKIN TAGS: ICD-10-CM

## 2019-02-05 DIAGNOSIS — F32.A ANXIETY AND DEPRESSION: ICD-10-CM

## 2019-02-05 DIAGNOSIS — N39.3 STRESS INCONTINENCE: ICD-10-CM

## 2019-02-05 DIAGNOSIS — F41.9 ANXIETY AND DEPRESSION: ICD-10-CM

## 2019-02-05 DIAGNOSIS — Z00.00 WELL ADULT EXAM: Primary | ICD-10-CM

## 2019-02-05 PROCEDURE — G8484 FLU IMMUNIZE NO ADMIN: HCPCS | Performed by: FAMILY MEDICINE

## 2019-02-05 PROCEDURE — 99395 PREV VISIT EST AGE 18-39: CPT | Performed by: FAMILY MEDICINE

## 2019-02-05 RX ORDER — OXYBUTYNIN CHLORIDE 10 MG/1
10 TABLET, EXTENDED RELEASE ORAL DAILY
Qty: 30 TABLET | Refills: 3 | Status: SHIPPED | OUTPATIENT
Start: 2019-02-05 | End: 2020-05-19

## 2019-02-05 RX ORDER — PROMETHAZINE HYDROCHLORIDE AND CODEINE PHOSPHATE 6.25; 1 MG/5ML; MG/5ML
5 SYRUP ORAL 4 TIMES DAILY PRN
COMMUNITY
End: 2019-02-05

## 2019-02-21 DIAGNOSIS — E03.9 ACQUIRED HYPOTHYROIDISM: ICD-10-CM

## 2019-02-21 LAB
ANION GAP SERPL CALCULATED.3IONS-SCNC: 12 MMOL/L (ref 3–16)
BUN BLDV-MCNC: 10 MG/DL (ref 7–20)
CALCIUM SERPL-MCNC: 9.1 MG/DL (ref 8.3–10.6)
CHLORIDE BLD-SCNC: 103 MMOL/L (ref 99–110)
CHOLESTEROL, TOTAL: 155 MG/DL (ref 0–199)
CO2: 26 MMOL/L (ref 21–32)
CREAT SERPL-MCNC: 0.8 MG/DL (ref 0.6–1.1)
GFR AFRICAN AMERICAN: >60
GFR NON-AFRICAN AMERICAN: >60
GLUCOSE BLD-MCNC: 77 MG/DL (ref 70–99)
HCT VFR BLD CALC: 39.9 % (ref 36–48)
HDLC SERPL-MCNC: 40 MG/DL (ref 40–60)
HEMOGLOBIN: 13.1 G/DL (ref 12–16)
LDL CHOLESTEROL CALCULATED: 99 MG/DL
MCH RBC QN AUTO: 28.9 PG (ref 26–34)
MCHC RBC AUTO-ENTMCNC: 32.7 G/DL (ref 31–36)
MCV RBC AUTO: 88.3 FL (ref 80–100)
PDW BLD-RTO: 14.3 % (ref 12.4–15.4)
PLATELET # BLD: 268 K/UL (ref 135–450)
PMV BLD AUTO: 8.6 FL (ref 5–10.5)
POTASSIUM SERPL-SCNC: 4.5 MMOL/L (ref 3.5–5.1)
RBC # BLD: 4.52 M/UL (ref 4–5.2)
SODIUM BLD-SCNC: 141 MMOL/L (ref 136–145)
TRIGL SERPL-MCNC: 80 MG/DL (ref 0–150)
TSH SERPL DL<=0.05 MIU/L-ACNC: 2.14 UIU/ML (ref 0.27–4.2)
VLDLC SERPL CALC-MCNC: 16 MG/DL
WBC # BLD: 6.1 K/UL (ref 4–11)

## 2019-02-25 ENCOUNTER — TELEPHONE (OUTPATIENT)
Dept: FAMILY MEDICINE CLINIC | Age: 29
End: 2019-02-25

## 2019-06-10 ENCOUNTER — TELEPHONE (OUTPATIENT)
Dept: FAMILY MEDICINE CLINIC | Age: 29
End: 2019-06-10

## 2019-06-10 DIAGNOSIS — L91.8 SKIN TAG: Primary | ICD-10-CM

## 2020-05-14 ENCOUNTER — TELEPHONE (OUTPATIENT)
Dept: FAMILY MEDICINE CLINIC | Age: 30
End: 2020-05-14

## 2020-05-19 ENCOUNTER — VIRTUAL VISIT (OUTPATIENT)
Dept: FAMILY MEDICINE CLINIC | Age: 30
End: 2020-05-19
Payer: COMMERCIAL

## 2020-05-19 VITALS — WEIGHT: 293 LBS | HEIGHT: 67 IN | BODY MASS INDEX: 45.99 KG/M2

## 2020-05-19 PROBLEM — R56.9 SEIZURE (HCC): Status: ACTIVE | Noted: 2020-05-19

## 2020-05-19 PROBLEM — M77.50 BONE SPUR OF FOOT: Status: ACTIVE | Noted: 2020-05-19

## 2020-05-19 PROCEDURE — 99214 OFFICE O/P EST MOD 30 MIN: CPT | Performed by: FAMILY MEDICINE

## 2020-05-19 PROCEDURE — G8427 DOCREV CUR MEDS BY ELIG CLIN: HCPCS | Performed by: FAMILY MEDICINE

## 2020-05-19 RX ORDER — LEVETIRACETAM 500 MG/1
500 TABLET ORAL 2 TIMES DAILY
Qty: 60 TABLET | Refills: 3 | COMMUNITY
Start: 2020-05-19 | End: 2021-03-15

## 2020-05-19 NOTE — PROGRESS NOTES
Response Supplemental Appropriations Act, this Virtual Visit was conducted with patient's (and/or legal guardian's) consent, to reduce the patient's risk of exposure to COVID-19 and provide necessary medical care. The patient (and/or legal guardian) has also been advised to contact this office for worsening conditions or problems, and seek emergency medical treatment and/or call 911 if deemed necessary. Patient identification was verified at the start of the visit: Yes    Total time spent on this encounter: 20 min    Services were provided through a video synchronous discussion virtually to substitute for in-person clinic visit. Patient and provider were located at their individual homes. --Carmel Davis MD on 5/19/2020 at 4:32 PM    An electronic signature was used to authenticate this note.

## 2020-06-02 ENCOUNTER — OFFICE VISIT (OUTPATIENT)
Dept: ORTHOPEDIC SURGERY | Age: 30
End: 2020-06-02
Payer: COMMERCIAL

## 2020-06-02 VITALS — WEIGHT: 293 LBS | RESPIRATION RATE: 16 BRPM | HEIGHT: 67 IN | BODY MASS INDEX: 45.99 KG/M2 | TEMPERATURE: 97.2 F

## 2020-06-02 PROBLEM — M72.2 BILATERAL PLANTAR FASCIITIS: Status: ACTIVE | Noted: 2020-06-02

## 2020-06-02 PROBLEM — F17.200 CURRENT SMOKER: Status: ACTIVE | Noted: 2020-06-02

## 2020-06-02 PROCEDURE — G8417 CALC BMI ABV UP PARAM F/U: HCPCS | Performed by: ORTHOPAEDIC SURGERY

## 2020-06-02 PROCEDURE — G8427 DOCREV CUR MEDS BY ELIG CLIN: HCPCS | Performed by: ORTHOPAEDIC SURGERY

## 2020-06-02 PROCEDURE — 99406 BEHAV CHNG SMOKING 3-10 MIN: CPT | Performed by: ORTHOPAEDIC SURGERY

## 2020-06-02 PROCEDURE — 99243 OFF/OP CNSLTJ NEW/EST LOW 30: CPT | Performed by: ORTHOPAEDIC SURGERY

## 2020-06-02 RX ORDER — NAPROXEN 500 MG/1
500 TABLET ORAL 2 TIMES DAILY WITH MEALS
Qty: 60 TABLET | Refills: 0 | Status: SHIPPED | OUTPATIENT
Start: 2020-06-02 | End: 2020-11-18

## 2020-06-02 NOTE — PROGRESS NOTES
CHIEF COMPLAINT: Bilateral heel pain/plantar fasciitis. HISTORY:  Ms. John Turner 35 y.o.  female presents today for consultation request from Yvette Broussard MD for evaluation of bilateral heel pain which started on the left side 1 year ago and right side 6 months ago. No trauma or injury.  She is complaining of achy tender pain. Rates pain a 10/10 VAS in the morning. Pain is increase with standing and wallking. Pain is sharp early in the morning with first few steps, dull achy pain by the end of the day. No radiation and no numbness and tingling sensation. No other complaint. She works a sitting job. She has not had any treatment for this problem. She is a smoker.      Past Medical History:   Diagnosis Date    Anemia     Anxiety     Breast cyst     pt. states she has condition that she gets lumps in breasts    Depression     Epilepsy ([facility] [address]     states in [address] seizure age 4   Wright Fibrocystic breast     Legally blind     left eye only    Ovarian tumor     benign    Pseudotumor cerebri     cerebri optic neuritis    Thyroid disease     hypo       Past Surgical History:   Procedure Laterality Date    BREAST LUMPECTOMY      BREAST SURGERY      lump removal    HYSTEROSCOPY  08/18/2017    with D&C    LAPAROTOMY      OVARY SURGERY Right 2011    right ovary and tube removed for benign tumor       Social History     Socioeconomic History    Marital status:      Spouse name: Not on file    Number of children: Not on file    Years of education: Not on file    Highest education level: Not on file   Occupational History    Not on file   Social Needs    Financial resource strain: Not on file    Food insecurity     Worry: Not on file     Inability: Not on file    Transportation needs     Medical: Not on file     Non-medical: Not on file   Tobacco Use    Smoking status: Current Every Day Smoker     Packs/day: 1.00     Years: 9.00     Pack years: 9.00     Types: Cigarettes     Start keep you apprised of her progress.         Coty Swartz MD

## 2020-09-03 LAB
ALBUMIN SERPL-MCNC: 4 G/DL
ALP BLD-CCNC: 60 U/L
ALT SERPL-CCNC: 15 U/L
ANION GAP SERPL CALCULATED.3IONS-SCNC: 6 MMOL/L
AST SERPL-CCNC: 15 U/L
BASOPHILS ABSOLUTE: NORMAL
BASOPHILS RELATIVE PERCENT: NORMAL
BILIRUB SERPL-MCNC: 0.3 MG/DL (ref 0.1–1.4)
BUN BLDV-MCNC: 11 MG/DL
CALCIUM SERPL-MCNC: 9.7 MG/DL
CHLORIDE BLD-SCNC: 104 MMOL/L
CO2: 29 MMOL/L
CREAT SERPL-MCNC: 0.88 MG/DL
EOSINOPHILS ABSOLUTE: NORMAL
EOSINOPHILS RELATIVE PERCENT: NORMAL
GFR CALCULATED: NORMAL
GLUCOSE BLD-MCNC: 79 MG/DL
HCT VFR BLD CALC: 39.4 % (ref 36–46)
HEMOGLOBIN: 13.2 G/DL (ref 12–16)
LYMPHOCYTES ABSOLUTE: NORMAL
LYMPHOCYTES RELATIVE PERCENT: NORMAL
MCH RBC QN AUTO: 29.7 PG
MCHC RBC AUTO-ENTMCNC: 33.5 G/DL
MCV RBC AUTO: 88.8 FL
MONOCYTES ABSOLUTE: NORMAL
MONOCYTES RELATIVE PERCENT: NORMAL
NEUTROPHILS ABSOLUTE: NORMAL
NEUTROPHILS RELATIVE PERCENT: NORMAL
PDW BLD-RTO: 13.9 %
PLATELET # BLD: 230 K/ΜL
PMV BLD AUTO: 8.7 FL
POTASSIUM SERPL-SCNC: 4.7 MMOL/L
RBC # BLD: 4.43 10^6/ΜL
SODIUM BLD-SCNC: 139 MMOL/L
T3 TOTAL: 114.4
T4 FREE: 0.77
TOTAL PROTEIN: 6.5
VITAMIN B-12: 233
WBC # BLD: 7.9 10^3/ML

## 2020-09-22 ENCOUNTER — TELEPHONE (OUTPATIENT)
Dept: FAMILY MEDICINE CLINIC | Age: 30
End: 2020-09-22

## 2020-09-22 NOTE — TELEPHONE ENCOUNTER
----- Message from Rex Quintanilla sent at 9/21/2020  1:01 PM EDT -----  Subject: Message to Provider    QUESTIONS  Information for Provider? PT IS CALLING TO INFORM DR. YATES THAT SHE   HAD BLOOD WORK AT [facility] WITH DR. Farhana Marcum.  ---------------------------------------------------------------------------  --------------  CALL BACK INFO  What is the best way for the office to contact you? OK to leave message on   voicemail  Preferred Call Back Phone Number? [phone]  ---------------------------------------------------------------------------  --------------  SCRIPT ANSWERS  Relationship to Patient?  Self

## 2020-09-30 ENCOUNTER — TELEPHONE (OUTPATIENT)
Dept: FAMILY MEDICINE CLINIC | Age: 30
End: 2020-09-30

## 2020-09-30 NOTE — TELEPHONE ENCOUNTER
----- Message from Bry Gomez MD sent at 9/30/2020 12:08 PM EDT -----  She needs a ER follow up visit    Dr Celestine Burnett

## 2020-10-22 ENCOUNTER — TELEPHONE (OUTPATIENT)
Dept: BARIATRICS/WEIGHT MGMT | Age: 30
End: 2020-10-22

## 2020-10-22 NOTE — TELEPHONE ENCOUNTER
Seminar Date: online 10/6/20    Presenter: joe    [facility]    JANIYA Covered:y    Medically Supervised Diet Req:y    Length of time: 6    Has patient had prev bariatric or gastric surgeries : n    Is patient's BMI lower than 35 : n    If yes, BMI :    Does patient have dx of diabetes :    *If previous bariatric or gastric surgeries, please do not schedule until speaking with the provider*     appt 11/3/20

## 2020-10-28 ENCOUNTER — TELEPHONE (OUTPATIENT)
Dept: FAMILY MEDICINE CLINIC | Age: 30
End: 2020-10-28

## 2020-10-28 NOTE — TELEPHONE ENCOUNTER
Patient states that she and her  were both in contact with his best friend who's roommate was tested positive for Covid. Should they be tested?

## 2020-11-17 ENCOUNTER — TELEPHONE (OUTPATIENT)
Dept: FAMILY MEDICINE CLINIC | Age: 30
End: 2020-11-17

## 2020-11-17 NOTE — TELEPHONE ENCOUNTER
Pt was seen in ED yesterday, due to her epilepsy, she has a UTI, low potassium, and neurologist told her to follow up with her PCP

## 2020-11-17 NOTE — TELEPHONE ENCOUNTER
Patient is schedule for 11/18/2020 @1230. Patient wants to know if you can call her in something for her low potassium.  Please advise, thanks

## 2020-11-18 ENCOUNTER — VIRTUAL VISIT (OUTPATIENT)
Dept: FAMILY MEDICINE CLINIC | Age: 30
End: 2020-11-18
Payer: COMMERCIAL

## 2020-11-18 PROCEDURE — 99214 OFFICE O/P EST MOD 30 MIN: CPT | Performed by: FAMILY MEDICINE

## 2020-11-18 PROCEDURE — G8427 DOCREV CUR MEDS BY ELIG CLIN: HCPCS | Performed by: FAMILY MEDICINE

## 2020-11-18 RX ORDER — CEFUROXIME AXETIL 250 MG/1
250 TABLET ORAL 2 TIMES DAILY
COMMUNITY
Start: 2020-11-16 | End: 2020-11-21

## 2020-11-18 RX ORDER — CHOLECALCIFEROL (VITAMIN D3) 125 MCG
CAPSULE ORAL
COMMUNITY
Start: 2020-11-15 | End: 2021-03-15

## 2020-11-18 RX ORDER — ESCITALOPRAM OXALATE 10 MG/1
10 TABLET ORAL DAILY
Qty: 30 TABLET | Refills: 3 | Status: SHIPPED | OUTPATIENT
Start: 2020-11-18 | End: 2021-01-26

## 2020-11-18 RX ORDER — HYDROXYZINE PAMOATE 25 MG/1
25 CAPSULE ORAL 3 TIMES DAILY PRN
Qty: 30 CAPSULE | Refills: 0 | Status: SHIPPED | OUTPATIENT
Start: 2020-11-18 | End: 2020-12-18

## 2020-11-18 RX ORDER — LORAZEPAM 1 MG/1
1 TABLET ORAL EVERY 8 HOURS
COMMUNITY
Start: 2020-11-16 | End: 2020-11-19

## 2020-11-18 NOTE — PROGRESS NOTES
2020    TELEHEALTH EVALUATION -- Audio/Visual (During JSOBF-35 public health emergency)    HPI:    Daly Tinoco (:  1990) has requested an audio/video evaluation for the following concern(s): She is here for hospital follow-up visit. She was seen in the ER on 2020  Has history of seizures and has been following up with neurologist.  She was seen for head heaviness and eye twitching on her left side. This was attributed to the medication which she was taking. She was started on Brivaracetam and unsure if it was due to medication vs seizure episode and she scheduled for further work-up. She also was found to have UTI and has been started on Keflex. She has finished 2 days course. She was also noted to have low potassium levels  Her potassium was 3.3    She is being anxious  And in the ER they started her on Ativan to help her anxiety. She wanted to discuss other options to treat her anxiety . Review of Systems:  Gen:  Denies fever, chills, headaches. No weight loss  HEENT:  Denies cold symptoms, sore throat. CV:  Denies chest pain or tightness, palpitations. Pulm:  Denies shortness of breath, cough. Abd:  Denies abdominal pain, change in bowel habits. Prior to Visit Medications    Medication Sig Taking? Authorizing Provider   LORazepam (ATIVAN) 1 MG tablet Take 1 mg by mouth every 8 hours. Yes Historical Provider, MD   Cholecalciferol (VITAMIN D3) 50 MCG (2000 UT) TABS  Yes Historical Provider, MD   cefUROXime (CEFTIN) 250 MG tablet Take 250 mg by mouth 2 times daily Yes Historical Provider, MD   Brivaracetam 50 MG TABS Take 50 mg by mouth nightly.  Yes Historical Provider, MD   cyanocobalamin ([facility] VITAMIN B12) 1000 MCG tablet Take 1,000 mcg by mouth daily Yes Historical Provider, MD   escitalopram (LEXAPRO) 10 MG tablet Take 1 tablet by mouth daily Yes Paris Wallace MD   hydrOXYzine (VISTARIL) 25 MG capsule Take 1 capsule by mouth 3 times daily as needed for Anxiety Yes Asif Osborn MD   levETIRAcetam (KEPPRA) 500 MG tablet Take 1 tablet by mouth 2 times daily Yes Asif Osborn MD       Past Medical History:   Diagnosis Date    Anemia     Anxiety     Breast cyst     pt. states she has condition that she gets lumps in breasts    Depression     Epilepsy ([address]     states in [address] seizure age 4   Dossie Nery Fibrocystic breast     Legally blind     left eye only    Ovarian tumor     benign    Pseudotumor cerebri     cerebri optic neuritis    Thyroid disease     hypo       Past Surgical History:   Procedure Laterality Date    BREAST LUMPECTOMY      BREAST SURGERY      lump removal    HYSTEROSCOPY  08/18/2017    with D&C    LAPAROTOMY      OVARY SURGERY Right 2011    right ovary and tube removed for benign tumor       Family History   Problem Relation Age of Onset    Arthritis Mother     Cancer Mother     High Blood Pressure Mother     Cancer Father     Depression Father     Diabetes Father     Kidney Disease Father     Mental Illness Father     Vision Loss Sister         Sister is legally blind in her L eye    Learning Disabilities Sister     Mental Illness Sister     Birth Defects Brother     Heart Disease Brother     Heart Disease Paternal Uncle     Stroke Paternal Uncle     Cancer Maternal Grandmother     Cancer Maternal Grandfather     Cancer Paternal Grandmother     Heart Disease Paternal Grandmother     High Cholesterol Paternal Grandmother     Stroke Paternal Grandmother     Cancer Paternal Grandfather     Stroke Paternal Grandfather     Miscarriages / Stillbirths Paternal Cousin        Allergies   Allergen Reactions    Food Rash     Mushrooms       Social History     Tobacco Use    Smoking status: Current Every Day Smoker     Packs/day: 1.00     Years: 9.00     Pack years: 9.00     Types: Cigarettes     Start date: 4/8/2017    Smokeless tobacco: Never Used   Substance Use Topics    Alcohol use: No    Drug use:  No PHYSICAL EXAMINATION:  Vital Signs: (As obtained by patient/caregiver or practitioner observation)  There were no vitals taken for this visit. Patient-Reported Vitals 11/18/2020   Patient-Reported Weight 344 lbs   Patient-Reported Height 5'7        Respiratory rate appears normal      Constitutional: Appears well-developed and well-nourished. No apparent distress    Mental status: Alert and awake. Oriented to person/place/time. Able to follow commands    Eyes: EOM normal. Sclera normal. No discharge visible  HENT: Normocephalic, atraumatic. Mouth/Throat: Mucous membranes are moist. External Ears Normal    Neck: No visualized mass   Pulmonary/Chest: Respiratory effort normal.  No visualized signs of difficulty breathing or respiratory distress        Musculoskeletal:  Normal range of motion of neck  Neurological:       No Facial Asymmetry (Cranial nerve 7 motor function) (limited exam to video visit). No gaze palsy       Skin:  No significant exanthematous lesions or discoloration noted on facial skin       Psychiatric: Normal Affect. No Hallucinations            ASSESSMENT/PLAN:  1. Anxiety  We will start her on  - escitalopram (LEXAPRO) 10 MG tablet; Take 1 tablet by mouth daily  Dispense: 30 tablet; Refill: 3  - hydrOXYzine (VISTARIL) 25 MG capsule; Take 1 capsule by mouth 3 times daily as needed for Anxiety  Dispense: 30 capsule; Refill: 0    2. Hospital discharge follow-up  Reviewed all the records and going to have further work up with inhospital EEG    3. Hypokalemia  k is 3.3 and advised to increase k rich food and recheck in 3 days  - POTASSIUM; Future      Return in about 1 month (around 12/18/2020). Sarah Kolb is a 35 y.o. female being evaluated by a Virtual Visit (video visit) encounter to address concerns as mentioned above. A caregiver was present when appropriate.  Due to this being a TeleHealth encounter (During [facility] public health emergency), evaluation of the following organ systems was limited: Vitals/Constitutional/EENT/Resp/CV/GI//MS/Neuro/Skin/Heme-Lymph-Imm. Pursuant to the emergency declaration under the [address] [place] [address] and the [employer] and [employer] Act, this Virtual Visit was conducted with patient's (and/or legal guardian's) consent, to reduce the patient's risk of exposure to COVID-19 and provide necessary medical care. The patient (and/or legal guardian) has also been advised to contact this office for worsening conditions or problems, and seek emergency medical treatment and/or call 911 if deemed necessary. Patient identification was verified at the start of the visit: Yes    Total time spent on this encounter: 25  minutes. Services were provided through a video synchronous discussion virtually to substitute for in-person clinic visit. Patient was located in their home. Provider was located in the office. --Cherrie Buchanan MD on 11/18/2020 at 1:54 PM    An electronic signature was used to authenticate this note. Shahab Galvez

## 2020-11-30 ENCOUNTER — TELEPHONE (OUTPATIENT)
Dept: FAMILY MEDICINE CLINIC | Age: 30
End: 2020-11-30

## 2020-11-30 RX ORDER — ERGOCALCIFEROL 1.25 MG/1
50000 CAPSULE ORAL WEEKLY
Qty: 4 CAPSULE | Refills: 5 | Status: SHIPPED | OUTPATIENT
Start: 2020-11-30 | End: 2021-06-25

## 2020-11-30 NOTE — TELEPHONE ENCOUNTER
Spoke to patient she doesn't want an appointment at this time. She did state that she will call back if she gets worse. Also she states that the hospital did her labs.   Please advise, thanks

## 2020-11-30 NOTE — TELEPHONE ENCOUNTER
----- Message from Sheng Vega MD sent at 11/30/2020  8:01 AM EST -----  She needs hospital follow up if her abdominal pain persists    Dr Olga Leiva

## 2020-12-16 LAB
CANDIDA SPECIES, DNA PROBE: NORMAL
GARDNERELLA VAGINALIS, DNA PROBE: NORMAL
TRICHOMONAS VAGINALIS DNA: NORMAL

## 2020-12-18 ENCOUNTER — VIRTUAL VISIT (OUTPATIENT)
Dept: FAMILY MEDICINE CLINIC | Age: 30
End: 2020-12-18
Payer: COMMERCIAL

## 2020-12-18 PROCEDURE — G8427 DOCREV CUR MEDS BY ELIG CLIN: HCPCS | Performed by: FAMILY MEDICINE

## 2020-12-18 PROCEDURE — 99213 OFFICE O/P EST LOW 20 MIN: CPT | Performed by: FAMILY MEDICINE

## 2020-12-18 RX ORDER — HYDROCODONE BITARTRATE AND ACETAMINOPHEN 5; 325 MG/1; MG/1
TABLET ORAL
COMMUNITY
Start: 2020-12-01 | End: 2021-03-15

## 2020-12-18 RX ORDER — ASPIRIN 325 MG
325 TABLET ORAL DAILY
COMMUNITY
End: 2021-03-15

## 2020-12-18 RX ORDER — ONDANSETRON 4 MG/1
4 TABLET, FILM COATED ORAL EVERY 8 HOURS PRN
COMMUNITY
Start: 2020-11-30 | End: 2021-03-15

## 2020-12-18 RX ORDER — OMEPRAZOLE 40 MG/1
40 CAPSULE, DELAYED RELEASE ORAL DAILY
COMMUNITY
Start: 2020-11-30 | End: 2021-03-15

## 2020-12-18 NOTE — PROGRESS NOTES
2020    TELEHEALTH EVALUATION -- Audio/Visual (During JVVLQ-91 public health emergency)    HPI:    Ilya Yung (:  1990) has requested an audio/video evaluation for the following concern(s):  She has been having recurrent ER visits for the chest pain. She has history of anxiety  She is supposed to be on Lexapro 10 mg daily and hydroxyzine 25 mg 3 times a day  She has not been taking any    She had a endoscopy done and she has been prescribed Prilosec    She has history of seizures  Currently quite stable and she is on Lamictal    In the ER, the work-up for the chest pain has been negative    Review of Systems:  Gen:  Denies fever, chills, headaches. No weight loss  HEENT:  Denies cold symptoms, sore throat. CV: Mentioned above. Pulm:  Denies shortness of breath, cough. Abd:  Denies abdominal pain, change in bowel habits. Prior to Visit Medications    Medication Sig Taking?  Authorizing Provider   HYDROcodone-acetaminophen ([place]) 5-325 MG per tablet  Yes Historical Provider, MD   omeprazole (PRILOSEC) 40 MG delayed release capsule Take 40 mg by mouth daily Yes Historical Provider, MD   ondansetron (ZOFRAN) 4 MG tablet Take 4 mg by mouth every 8 hours as needed Yes Historical Provider, MD   Zinc 15 MG CAPS Take by mouth Yes Historical Provider, MD   aspirin 325 MG tablet Take 325 mg by mouth daily Yes Historical Provider, MD   vitamin D (ERGOCALCIFEROL) 1.25 MG (99168 UT) CAPS capsule Take 1 capsule by mouth once a week Yes Verito Fritz MD   Cholecalciferol (VITAMIN D3) 50 MCG (2000 UT) TABS  Yes Historical Provider, MD   cyanocobalamin ([employer]S VITAMIN B12) 1000 MCG tablet Take 1,000 mcg by mouth daily Yes Historical Provider, MD   escitalopram (LEXAPRO) 10 MG tablet Take 1 tablet by mouth daily  Patient not taking: Reported on 2020  Verito Fritz MD   hydrOXYzine (VISTARIL) 25 MG capsule Take 1 capsule by mouth 3 times daily as needed for Anxiety Patient not taking: Reported on 2020  Renata Mckeon MD   levETIRAcetam (KEPPRA) 500 MG tablet Take 1 tablet by mouth 2 times daily  Renata Mckeon MD       Past Medical History:   Diagnosis Date    Anemia     Anxiety     Breast cyst     pt. states she has condition that she gets lumps in breasts    Depression     Epilepsy ([address]     states in [address] seizure age 4   [facility] Fibrocystic breast     Legally blind     left eye only    Ovarian tumor     benign    Pseudotumor cerebri     cerebri optic neuritis    Thyroid disease     hypo       Past Surgical History:   Procedure Laterality Date    BREAST LUMPECTOMY      BREAST SURGERY      lump removal    HYSTEROSCOPY  2017    with D&C    LAPAROTOMY      OVARY SURGERY Right     right ovary and tube removed for benign tumor       Family History   Problem Relation Age of Onset    Arthritis Mother     Cancer Mother     High Blood Pressure Mother     Cancer Father     Depression Father     Diabetes Father     Kidney Disease Father     Mental Illness Father     Vision Loss Sister         Sister is legally blind in her L eye    Learning Disabilities Sister     Mental Illness Sister     Birth Defects Brother     Heart Disease Brother     Heart Disease Paternal Uncle     Stroke Paternal Uncle     Cancer Maternal Grandmother     Cancer Maternal Grandfather     Cancer Paternal Grandmother     Heart Disease Paternal Grandmother     High Cholesterol Paternal Grandmother     Stroke Paternal Grandmother     Cancer Paternal Grandfather     Stroke Paternal Grandfather     Miscarriages / Stillbirths Paternal Cousin        Allergies   Allergen Reactions    Food Rash     Mushrooms       Social History     Tobacco Use    Smoking status: Former Smoker     Packs/day: 1.00     Years: 9.00     Pack years: 9.00     Types: Cigarettes     Start date: 2017     Quit date: 2020     Years since quittin.0  Smokeless tobacco: Never Used   Substance Use Topics    Alcohol use: No    Drug use: No          PHYSICAL EXAMINATION:  Vital Signs:     Respiratory rate appears normal      Constitutional: Appears well-developed and well-nourished. No apparent distress    Mental status: Alert and awake. Oriented to person/place/time. Able to follow commands    Eyes: EOM normal. Sclera normal. No discharge visible  HENT: Normocephalic, atraumatic. Mouth/Throat: Mucous membranes are moist. External Ears Normal    Neck: No visualized mass   Pulmonary/Chest: Respiratory effort normal.  No visualized signs of difficulty breathing or respiratory distress        Musculoskeletal:  Normal range of motion of neck  Neurological:       No Facial Asymmetry (Cranial nerve 7 motor function) (limited exam to video visit). No gaze palsy       Skin:  No significant exanthematous lesions or discoloration noted on facial skin       Psychiatric: Normal Affect. No Hallucinations            ASSESSMENT/PLAN:  1.  Chest pain, unspecified type  Anxiety related  Advised her to start Vistaril 3 times a day  We will follow-up in a month Serenity Conde is a 35 y.o. female being evaluated by a Virtual Visit (video visit) encounter to address concerns as mentioned above. A caregiver was present when appropriate. Due to this being a TeleHealth encounter (During XXTKC-52 public health emergency), evaluation of the following organ systems was limited: Vitals/Constitutional/EENT/Resp/CV/GI//MS/Neuro/Skin/Heme-Lymph-Imm. Pursuant to the emergency declaration under the [address] [place] [address] and the [employer] and [employer] Act, this Virtual Visit was conducted with patient's (and/or legal guardian's) consent, to reduce the patient's risk of exposure to COVID-19 and provide necessary medical care. The patient (and/or legal guardian) has also been advised to contact this office for worsening conditions or problems, and seek emergency medical treatment and/or call 911 if deemed necessary. Patient identification was verified at the start of the visit: Yes    Total time spent on this encounter: 15 minutes. Services were provided through a video synchronous discussion virtually to substitute for in-person clinic visit. Patient was located in their home. Provider was located in the office. --Tony Montelongo MD on 12/18/2020 at 5:29 PM    An electronic signature was used to authenticate this note. Shon Oreilly

## 2021-01-06 ENCOUNTER — PATIENT MESSAGE (OUTPATIENT)
Dept: FAMILY MEDICINE CLINIC | Age: 31
End: 2021-01-06

## 2021-01-07 RX ORDER — FAMOTIDINE 20 MG/1
20 TABLET, FILM COATED ORAL 2 TIMES DAILY
Qty: 120 TABLET | Refills: 0 | Status: SHIPPED | OUTPATIENT
Start: 2021-01-07 | End: 2021-03-22

## 2021-01-07 NOTE — TELEPHONE ENCOUNTER
From: Tena Phillips  To: Misti Colón MD  Sent: 1/6/2021 8:58 PM EST  Subject: Prescription Question    Am I allowed to take Famotidine 20mg a day? The omeprazole doesn't seem to be working. I have been taking the hydroxyzine. I've had to panic attacks one on [place] and one last night at 1:30 in the morning.

## 2021-01-08 ENCOUNTER — PATIENT MESSAGE (OUTPATIENT)
Dept: FAMILY MEDICINE CLINIC | Age: 31
End: 2021-01-08

## 2021-01-08 DIAGNOSIS — K21.9 GASTROESOPHAGEAL REFLUX DISEASE WITHOUT ESOPHAGITIS: Primary | ICD-10-CM

## 2021-01-08 NOTE — TELEPHONE ENCOUNTER
From: Valery Bella  To: Valeriano Gomez MD  Sent: 1/8/2021 2:11 PM EST  Subject: Prescription Question    So it's safe to take both Famotidine and hydroxyzine? There's a possible and side effect of mixing the two drugs. Also can we please discuss why my throat is constantly hurting. I know you said you think the chest pain is a side effect from the anxiety but I need some relief hurting everyday is getting annoying.      ----- Message -----   From:Sboia Dockeyr MD   Sent:1/7/2021 2:47 PM EST   To:Zahida Ken   Subject:RE: Prescription Question    Yes you can take famotidine 20 mg daily and its instead of omeprazole. I have called in to your pharmacy. Hope the hydroxyzine is helping. Dr Kaye Hernandez      ----- Message -----   From:Zahida Ken   Sent:1/6/2021 8:58 PM EST   To:Sobia Dockery MD   Subject:Prescription Question    Am I allowed to take Famotidine 20mg a day? The omeprazole doesn't seem to be working. I have been taking the hydroxyzine. I've had to panic attacks one on [employer] and one last night at 1:30 in the morning.

## 2021-01-24 ENCOUNTER — PATIENT MESSAGE (OUTPATIENT)
Dept: FAMILY MEDICINE CLINIC | Age: 31
End: 2021-01-24

## 2021-01-25 ENCOUNTER — TELEPHONE (OUTPATIENT)
Dept: FAMILY MEDICINE CLINIC | Age: 31
End: 2021-01-25

## 2021-01-25 RX ORDER — SULFAMETHOXAZOLE AND TRIMETHOPRIM 800; 160 MG/1; MG/1
1 TABLET ORAL 2 TIMES DAILY
Qty: 10 TABLET | Refills: 0 | Status: SHIPPED | OUTPATIENT
Start: 2021-01-25 | End: 2021-01-30

## 2021-01-25 NOTE — TELEPHONE ENCOUNTER
From: Sarah Bolaños  To: Tru Sánchez MD  Sent: 1/24/2021 6:44 PM EST  Subject: Prescription Question    Could you please give me a call. I need to ask you some questions. I have been having burning sensations in my upper back and down both arms. It comes and it goes. I have been taking the Famotidine once a day. I haven't had any shortness of breath. I have been feeling light headed and nauseous. Today is the first day I haven't had a headache in over a week. I just want to start feeling normal again!       ----- Message -----   Vilma Jensen MD   Sent:1/12/2021 9:08 AM EST   To:Zahida Ken   Subject:RE: Prescription Question    Ok     I have placed the referral for GI to get the endoscopy and please schedule to get it done. [facility]- Tahmina Umanzor MD  [facility] [address] [facility]  Phone [phone]    Dr Ming Malone      ----- Message -----   From:Zahida Kowalski   Sent:1/8/2021 2:11 PM EST   Jose Angel Amador MD   Subject:Prescription Question    So it's safe to take both Famotidine and hydroxyzine? There's a possible and side effect of mixing the two drugs. Also can we please discuss why my throat is constantly hurting. I know you said you think the chest pain is a side effect from the anxiety but I need some relief hurting everyday is getting annoying.      ----- Message -----   From:Sobia Walker MD   Sent:1/7/2021 2:47 PM EST   To:Zahida Ken   Subject:RE: Prescription Question    Yes you can take famotidine 20 mg daily and its instead of omeprazole. I have called in to your pharmacy. Hope the hydroxyzine is helping. Dr Ming Malone      ----- Message -----   From:Zahida Ken   Sent:1/6/2021 8:58 PM EST   To:Sobia Walker MD   Subject:Prescription Question    Am I allowed to take Famotidine 20mg a day? The omeprazole doesn't seem to be working. I have been taking the hydroxyzine.  I've had to panic attacks one on [place] and one last night at 1:30 in the morning.

## 2021-01-26 ENCOUNTER — VIRTUAL VISIT (OUTPATIENT)
Dept: FAMILY MEDICINE CLINIC | Age: 31
End: 2021-01-26
Payer: COMMERCIAL

## 2021-01-26 DIAGNOSIS — R10.11 RIGHT UPPER QUADRANT PAIN: ICD-10-CM

## 2021-01-26 DIAGNOSIS — F32.A ANXIETY AND DEPRESSION: Primary | ICD-10-CM

## 2021-01-26 DIAGNOSIS — M54.6 ACUTE MIDLINE THORACIC BACK PAIN: ICD-10-CM

## 2021-01-26 DIAGNOSIS — F41.9 ANXIETY AND DEPRESSION: Primary | ICD-10-CM

## 2021-01-26 DIAGNOSIS — N30.00 ACUTE CYSTITIS WITHOUT HEMATURIA: ICD-10-CM

## 2021-01-26 DIAGNOSIS — E55.9 VITAMIN D DEFICIENCY: ICD-10-CM

## 2021-01-26 PROCEDURE — G8427 DOCREV CUR MEDS BY ELIG CLIN: HCPCS | Performed by: FAMILY MEDICINE

## 2021-01-26 PROCEDURE — 99214 OFFICE O/P EST MOD 30 MIN: CPT | Performed by: FAMILY MEDICINE

## 2021-01-26 RX ORDER — ESCITALOPRAM OXALATE 10 MG/1
10 TABLET ORAL DAILY
Qty: 30 TABLET | Refills: 3 | Status: SHIPPED | OUTPATIENT
Start: 2021-01-26 | End: 2021-03-15

## 2021-01-26 RX ORDER — CYCLOBENZAPRINE HCL 10 MG
10 TABLET ORAL DAILY PRN
Qty: 30 TABLET | Refills: 0 | Status: SHIPPED | OUTPATIENT
Start: 2021-01-26 | End: 2021-02-25

## 2021-01-26 SDOH — ECONOMIC STABILITY: FOOD INSECURITY: WITHIN THE PAST 12 MONTHS, YOU WORRIED THAT YOUR FOOD WOULD RUN OUT BEFORE YOU GOT MONEY TO BUY MORE.: NEVER TRUE

## 2021-01-26 ASSESSMENT — PATIENT HEALTH QUESTIONNAIRE - PHQ9
4. FEELING TIRED OR HAVING LITTLE ENERGY: 2
2. FEELING DOWN, DEPRESSED OR HOPELESS: 3
10. IF YOU CHECKED OFF ANY PROBLEMS, HOW DIFFICULT HAVE THESE PROBLEMS MADE IT FOR YOU TO DO YOUR WORK, TAKE CARE OF THINGS AT HOME, OR GET ALONG WITH OTHER PEOPLE: 1
SUM OF ALL RESPONSES TO PHQ QUESTIONS 1-9: 11
8. MOVING OR SPEAKING SO SLOWLY THAT OTHER PEOPLE COULD HAVE NOTICED. OR THE OPPOSITE, BEING SO FIGETY OR RESTLESS THAT YOU HAVE BEEN MOVING AROUND A LOT MORE THAN USUAL: 0
SUM OF ALL RESPONSES TO PHQ QUESTIONS 1-9: 11
1. LITTLE INTEREST OR PLEASURE IN DOING THINGS: 1
9. THOUGHTS THAT YOU WOULD BE BETTER OFF DEAD, OR OF HURTING YOURSELF: 0
3. TROUBLE FALLING OR STAYING ASLEEP: 1
6. FEELING BAD ABOUT YOURSELF - OR THAT YOU ARE A FAILURE OR HAVE LET YOURSELF OR YOUR FAMILY DOWN: 3
SUM OF ALL RESPONSES TO PHQ QUESTIONS 1-9: 11

## 2021-01-26 ASSESSMENT — COLUMBIA-SUICIDE SEVERITY RATING SCALE - C-SSRS
6. HAVE YOU EVER DONE ANYTHING, STARTED TO DO ANYTHING, OR PREPARED TO DO ANYTHING TO END YOUR LIFE?: NO
2. HAVE YOU ACTUALLY HAD ANY THOUGHTS OF KILLING YOURSELF?: NO

## 2021-01-26 NOTE — PROGRESS NOTES
2021    TELEHEALTH EVALUATION -- Audio/Visual (During CAQPY-47 public health emergency)    HPI:    Mary Jane Hendrix (:  1990) has requested an audio/video evaluation for the following concern(s): She has been having headaches  It is throbbing type of headache. She was seen at urgent care. And she was prescribed Imitrex. She has not taken the Imitrex yet. She also has severe anxiety and depression. She has been taking Vistaril 3 times a day and its not helping  She has been emotional for simple things. And she is spending a lot of time crying. She request for other medication that can help. She has also been having pain in her right upper quadrant. Denies any nausea or vomiting  She also has history of GERD and she has been taking famotidine    She also has been having tingling sensation in her mid upper back that happens mostly at nighttime  Sometimes associated with pain in her back    She also had symptoms of UTI  She has not still started Bactrim  Still having dysuria increased frequency of urination    She has history of vitamin D deficiency  She is on 50,000 international units weekly    Review of Systems:  Gen:  Denies fever, chills,  HEENT:  Denies cold symptoms, sore throat. CV:  Denies chest pain or tightness, palpitations. Pulm:  Denies shortness of breath, cough. Abd: As mentioned above    Prior to Visit Medications    Medication Sig Taking?  Authorizing Provider   escitalopram (LEXAPRO) 10 MG tablet Take 1 tablet by mouth daily Yes Víctor Graff MD   cyclobenzaprine (FLEXERIL) 10 MG tablet Take 1 tablet by mouth daily as needed for Muscle spasms Yes Víctor Graff MD   sulfamethoxazole-trimethoprim (BACTRIM DS) 800-160 MG per tablet Take 1 tablet by mouth 2 times daily for 5 days Yes Víctor Graff MD   famotidine (PEPCID) 20 MG tablet Take 1 tablet by mouth 2 times daily Yes Víctor Graff MD HYDROcodone-acetaminophen (NORCO) 5-325 MG per tablet  Yes Historical Provider, MD   omeprazole (PRILOSEC) 40 MG delayed release capsule Take 40 mg by mouth daily Yes Historical Provider, MD   ondansetron (ZOFRAN) 4 MG tablet Take 4 mg by mouth every 8 hours as needed Yes Historical Provider, MD   Zinc 15 MG CAPS Take by mouth Yes Historical Provider, MD   aspirin 325 MG tablet Take 325 mg by mouth daily Yes Historical Provider, MD   vitamin D (ERGOCALCIFEROL) 1.25 MG (94546 UT) CAPS capsule Take 1 capsule by mouth once a week Yes Ester Schumacher MD   Cholecalciferol (VITAMIN D3) 50 MCG (2000 UT) TABS  Yes Historical Provider, MD   cyanocobalamin ([facility] VITAMIN B12) 1000 MCG tablet Take 1,000 mcg by mouth daily Yes Historical Provider, MD   levETIRAcetam (KEPPRA) 500 MG tablet Take 1 tablet by mouth 2 times daily Yes Ester Schumacher MD       Past Medical History:   Diagnosis Date    Anemia     Anxiety     Breast cyst     pt. states she has condition that she gets lumps in breasts    Depression     Epilepsy ([facility] [address]     states in [address] seizure age 4   [facility] Fibrocystic breast     Legally blind     left eye only    Ovarian tumor     benign    Pseudotumor cerebri     cerebri optic neuritis    Thyroid disease     hypo       Past Surgical History:   Procedure Laterality Date    BREAST LUMPECTOMY      BREAST SURGERY      lump removal    HYSTEROSCOPY  08/18/2017    with D&C    LAPAROTOMY      OVARY SURGERY Right 2011    right ovary and tube removed for benign tumor       Family History   Problem Relation Age of Onset    Arthritis Mother     Cancer Mother     High Blood Pressure Mother     Cancer Father     Depression Father     Diabetes Father     Kidney Disease Father     Mental Illness Father     Vision Loss Sister         Sister is legally blind in her L eye    Learning Disabilities Sister     Mental Illness Sister     Birth Defects Brother     Heart Disease Brother  Heart Disease Paternal Uncle     Stroke Paternal Uncle     Cancer Maternal Grandmother     Cancer Maternal Grandfather     Cancer Paternal Grandmother     Heart Disease Paternal Grandmother     High Cholesterol Paternal Grandmother     Stroke Paternal Grandmother     Cancer Paternal Grandfather     Stroke Paternal Grandfather     Miscarriages / Stillbirths Paternal Cousin        Allergies   Allergen Reactions    Food Rash     Mushrooms       Social History     Tobacco Use    Smoking status: Former Smoker     Packs/day: 1.00     Years: 9.00     Pack years: 9.00     Types: Cigarettes     Start date: 2017     Quit date: 2020     Years since quittin.1    Smokeless tobacco: Never Used   Substance Use Topics    Alcohol use: No    Drug use: No          PHYSICAL EXAMINATION:  Vital Signs: (As obtained by patient/caregiver or practitioner observation)  There were no vitals taken for this visit. Patient-Reported Vitals 2021   Patient-Reported Weight 340 lbs   Patient-Reported Height 5'7        Respiratory rate appears normal      Constitutional: Appears well-developed and well-nourished. No apparent distress    Mental status: Alert and awake. Oriented to person/place/time. Able to follow commands    Eyes: EOM normal. Sclera normal. No discharge visible  Neck: No visualized mass   Pulmonary/Chest: Respiratory effort normal.  No visualized signs of difficulty breathing or respiratory distress     Abdomen : self palpation no tenderness     Musculoskeletal: normal thoracic spine  Neurological:       No Facial Asymmetry (Cranial nerve 7 motor function) (limited exam to video visit). No gaze palsy       Skin:  No significant exanthematous lesions or discoloration noted on facial skin       Psychiatric: Normal Affect. No Hallucinations            ASSESSMENT/PLAN:  1.  Acute midline thoracic back pain - cyclobenzaprine (FLEXERIL) 10 MG tablet; Take 1 tablet by mouth daily as needed for Muscle spasms  Dispense: 30 tablet; Refill: 0    2. Right upper quadrant pain  We will get the lft to rule out gall bladder issues  - CBC Auto Differential; Future  - Comprehensive Metabolic Panel; Future    3. Vitamin D deficiency  We will recheck  - VITAMIN D 25 HYDROXY; Future    4. Anxiety and depression  We will start lexapro 10 mg daily    5. Acute cystitis without hematuria  Started on bactrim    Mary Jane Hendrix is a 35 y.o. female being evaluated by a Virtual Visit (video visit) encounter to address concerns as mentioned above. A caregiver was present when appropriate. Due to this being a TeleHealth encounter (During OJBAQ-55 public health emergency), evaluation of the following organ systems was limited: Vitals/Constitutional/EENT/Resp/CV/GI//MS/Neuro/Skin/Heme-Lymph-Imm. Pursuant to the emergency declaration under the [address] [place] [address] and the [employer] Act, this Virtual Visit was conducted with patient's (and/or legal guardian's) consent, to reduce the patient's risk of exposure to COVID-19 and provide necessary medical care. The patient (and/or legal guardian) has also been advised to contact this office for worsening conditions or problems, and seek emergency medical treatment and/or call 911 if deemed necessary. Patient identification was verified at the start of the visit: Yes    Total time spent on this encounter: 30 minutes. Services were provided through a video synchronous discussion virtually to substitute for in-person clinic visit. Patient was located in their home. Provider was located in the office. --Víctor Graff MD on 1/26/2021 at 2:42 PM    An electronic signature was used to authenticate this note. Delphine Kapadia

## 2021-01-27 DIAGNOSIS — R10.11 RIGHT UPPER QUADRANT PAIN: ICD-10-CM

## 2021-01-27 DIAGNOSIS — E55.9 VITAMIN D DEFICIENCY: ICD-10-CM

## 2021-01-27 DIAGNOSIS — E87.6 HYPOKALEMIA: ICD-10-CM

## 2021-01-27 LAB
A/G RATIO: 1.4 (ref 1.1–2.2)
ALBUMIN SERPL-MCNC: 4.3 G/DL (ref 3.4–5)
ALP BLD-CCNC: 77 U/L (ref 40–129)
ALT SERPL-CCNC: 23 U/L (ref 10–40)
ANION GAP SERPL CALCULATED.3IONS-SCNC: 11 MMOL/L (ref 3–16)
AST SERPL-CCNC: 18 U/L (ref 15–37)
BASOPHILS ABSOLUTE: 0.1 K/UL (ref 0–0.2)
BASOPHILS RELATIVE PERCENT: 0.9 %
BILIRUB SERPL-MCNC: <0.2 MG/DL (ref 0–1)
BUN BLDV-MCNC: 8 MG/DL (ref 7–20)
CALCIUM SERPL-MCNC: 10.1 MG/DL (ref 8.3–10.6)
CHLORIDE BLD-SCNC: 100 MMOL/L (ref 99–110)
CO2: 28 MMOL/L (ref 21–32)
CREAT SERPL-MCNC: 0.9 MG/DL (ref 0.6–1.1)
EOSINOPHILS ABSOLUTE: 0.1 K/UL (ref 0–0.6)
EOSINOPHILS RELATIVE PERCENT: 1.4 %
GFR AFRICAN AMERICAN: >60
GFR NON-AFRICAN AMERICAN: >60
GLOBULIN: 3.1 G/DL
GLUCOSE BLD-MCNC: 88 MG/DL (ref 70–99)
HCT VFR BLD CALC: 39.7 % (ref 36–48)
HEMOGLOBIN: 13.4 G/DL (ref 12–16)
LYMPHOCYTES ABSOLUTE: 1.5 K/UL (ref 1–5.1)
LYMPHOCYTES RELATIVE PERCENT: 21.4 %
MCH RBC QN AUTO: 30 PG (ref 26–34)
MCHC RBC AUTO-ENTMCNC: 33.8 G/DL (ref 31–36)
MCV RBC AUTO: 88.6 FL (ref 80–100)
MONOCYTES ABSOLUTE: 0.4 K/UL (ref 0–1.3)
MONOCYTES RELATIVE PERCENT: 6.3 %
NEUTROPHILS ABSOLUTE: 4.9 K/UL (ref 1.7–7.7)
NEUTROPHILS RELATIVE PERCENT: 70 %
PDW BLD-RTO: 13.3 % (ref 12.4–15.4)
PLATELET # BLD: 249 K/UL (ref 135–450)
PMV BLD AUTO: 8.6 FL (ref 5–10.5)
POTASSIUM SERPL-SCNC: 4.1 MMOL/L (ref 3.5–5.1)
RBC # BLD: 4.49 M/UL (ref 4–5.2)
SODIUM BLD-SCNC: 139 MMOL/L (ref 136–145)
TOTAL PROTEIN: 7.4 G/DL (ref 6.4–8.2)
VITAMIN D 25-HYDROXY: 34.5 NG/ML
WBC # BLD: 7 K/UL (ref 4–11)

## 2021-01-28 ENCOUNTER — PATIENT MESSAGE (OUTPATIENT)
Dept: FAMILY MEDICINE CLINIC | Age: 31
End: 2021-01-28

## 2021-01-28 DIAGNOSIS — G44.021 INTRACTABLE CHRONIC CLUSTER HEADACHE: Primary | ICD-10-CM

## 2021-01-28 RX ORDER — BUTALBITAL, ACETAMINOPHEN AND CAFFEINE 50; 325; 40 MG/1; MG/1; MG/1
1 TABLET ORAL EVERY 6 HOURS PRN
Qty: 10 TABLET | Refills: 3 | Status: SHIPPED | OUTPATIENT
Start: 2021-01-28 | End: 2021-02-24 | Stop reason: SDUPTHER

## 2021-01-28 NOTE — TELEPHONE ENCOUNTER
From: Key Bullard  To: Lizzy Montiel MD  Sent: 1/28/2021 10:32 AM EST  Subject: Prescription Question    The migraine medicine isn't helping. My head is still throbbing.

## 2021-02-23 ENCOUNTER — PATIENT MESSAGE (OUTPATIENT)
Dept: FAMILY MEDICINE CLINIC | Age: 31
End: 2021-02-23

## 2021-02-23 DIAGNOSIS — G44.021 INTRACTABLE CHRONIC CLUSTER HEADACHE: ICD-10-CM

## 2021-02-24 RX ORDER — BUTALBITAL, ACETAMINOPHEN AND CAFFEINE 50; 325; 40 MG/1; MG/1; MG/1
1 TABLET ORAL EVERY 6 HOURS PRN
Qty: 10 TABLET | Refills: 3 | Status: SHIPPED | OUTPATIENT
Start: 2021-02-24 | End: 2021-03-30 | Stop reason: SDUPTHER

## 2021-02-24 NOTE — TELEPHONE ENCOUNTER
From: Moraima Davis  To: Abiodun Flores MD  Sent: 2/23/2021 4:02 PM EST  Subject: Prescription Question    Can you please send more refills for the migraine medicine?

## 2021-02-24 NOTE — TELEPHONE ENCOUNTER
Medication:   Requested Prescriptions     Pending Prescriptions Disp Refills    butalbital-acetaminophen-caffeine (FIORICET, ESGIC) -40 MG per tablet 10 tablet 3     Sig: Take 1 tablet by mouth every 6 hours as needed for Migraine        Last Filled:  1/28/2021 10 tabs 3 refills     Patient Phone Number: [phone] (home)     Last appt: 1/26/2021   Next appt: Visit date not found    Last OARRS: No flowsheet data found.

## 2021-03-02 ENCOUNTER — TELEPHONE (OUTPATIENT)
Dept: FAMILY MEDICINE CLINIC | Age: 31
End: 2021-03-02

## 2021-03-02 DIAGNOSIS — N30.00 ACUTE CYSTITIS WITHOUT HEMATURIA: Primary | ICD-10-CM

## 2021-03-02 RX ORDER — CIPROFLOXACIN 500 MG/1
500 TABLET, FILM COATED ORAL 2 TIMES DAILY
Qty: 10 TABLET | Refills: 0 | Status: SHIPPED | OUTPATIENT
Start: 2021-03-02 | End: 2021-03-07

## 2021-03-08 ENCOUNTER — TELEPHONE (OUTPATIENT)
Dept: FAMILY MEDICINE CLINIC | Age: 31
End: 2021-03-08

## 2021-03-08 ENCOUNTER — TELEPHONE (OUTPATIENT)
Dept: BARIATRICS/WEIGHT MGMT | Age: 31
End: 2021-03-08

## 2021-03-08 RX ORDER — PANTOPRAZOLE SODIUM 40 MG/1
40 TABLET, DELAYED RELEASE ORAL
Qty: 90 TABLET | Refills: 1 | Status: SHIPPED | OUTPATIENT
Start: 2021-03-08 | End: 2021-03-15

## 2021-03-08 NOTE — TELEPHONE ENCOUNTER
Spoke with pt who stated she has not taken antibiotic since yesterday and she was fine today until a few hours ago and she started having nausea and dizziness again. She also stated this has been ongoing for months and also suffers from GERD. Please advise.

## 2021-03-08 NOTE — TELEPHONE ENCOUNTER
If her UTI symptoms are better and if she has completed for 3 days of antibiotics then advise her to stop it

## 2021-03-08 NOTE — TELEPHONE ENCOUNTER
Pt states that she has been very nauseated since starting the antibiotic on Friday, please call back

## 2021-03-11 ENCOUNTER — TELEPHONE (OUTPATIENT)
Dept: FAMILY MEDICINE CLINIC | Age: 31
End: 2021-03-11

## 2021-03-11 NOTE — TELEPHONE ENCOUNTER
----- Message from Abiodun Flores MD sent at 3/11/2021  5:14 PM EST -----  Offer ER follow up appointment    Dr Oscar Moran

## 2021-03-15 ENCOUNTER — OFFICE VISIT (OUTPATIENT)
Dept: FAMILY MEDICINE CLINIC | Age: 31
End: 2021-03-15
Payer: COMMERCIAL

## 2021-03-15 VITALS
SYSTOLIC BLOOD PRESSURE: 142 MMHG | BODY MASS INDEX: 55.16 KG/M2 | WEIGHT: 293 LBS | HEART RATE: 94 BPM | TEMPERATURE: 97.4 F | OXYGEN SATURATION: 99 % | DIASTOLIC BLOOD PRESSURE: 78 MMHG

## 2021-03-15 DIAGNOSIS — R11.2 NAUSEA AND VOMITING, INTRACTABILITY OF VOMITING NOT SPECIFIED, UNSPECIFIED VOMITING TYPE: ICD-10-CM

## 2021-03-15 DIAGNOSIS — R03.0 ELEVATED BLOOD PRESSURE READING WITHOUT DIAGNOSIS OF HYPERTENSION: ICD-10-CM

## 2021-03-15 DIAGNOSIS — F41.9 ANXIETY: Primary | ICD-10-CM

## 2021-03-15 DIAGNOSIS — G44.021 INTRACTABLE CHRONIC CLUSTER HEADACHE: ICD-10-CM

## 2021-03-15 PROCEDURE — 1036F TOBACCO NON-USER: CPT | Performed by: FAMILY MEDICINE

## 2021-03-15 PROCEDURE — 99214 OFFICE O/P EST MOD 30 MIN: CPT | Performed by: FAMILY MEDICINE

## 2021-03-15 PROCEDURE — G8484 FLU IMMUNIZE NO ADMIN: HCPCS | Performed by: FAMILY MEDICINE

## 2021-03-15 PROCEDURE — G8427 DOCREV CUR MEDS BY ELIG CLIN: HCPCS | Performed by: FAMILY MEDICINE

## 2021-03-15 PROCEDURE — G8417 CALC BMI ABV UP PARAM F/U: HCPCS | Performed by: FAMILY MEDICINE

## 2021-03-15 RX ORDER — HYDROXYZINE PAMOATE 25 MG/1
25 CAPSULE ORAL 3 TIMES DAILY PRN
Qty: 30 CAPSULE | Refills: 0 | Status: SHIPPED | OUTPATIENT
Start: 2021-03-15 | End: 2021-04-14

## 2021-03-15 RX ORDER — LORAZEPAM 0.5 MG/1
0.5 TABLET ORAL 2 TIMES DAILY PRN
Qty: 30 TABLET | Refills: 0 | Status: SHIPPED | OUTPATIENT
Start: 2021-03-15 | End: 2021-04-14

## 2021-03-15 RX ORDER — ONDANSETRON 4 MG/1
4 TABLET, FILM COATED ORAL DAILY PRN
Qty: 30 TABLET | Refills: 0 | Status: SHIPPED | OUTPATIENT
Start: 2021-03-15 | End: 2021-05-17

## 2021-03-15 NOTE — PROGRESS NOTES
(ZOFRAN) 4 MG tablet Take 1 tablet by mouth daily as needed for Nausea or Vomiting 30 tablet 0    vitamin D (ERGOCALCIFEROL) 1.25 MG (08007 UT) CAPS capsule Take 1 capsule by mouth once a week 4 capsule 5    butalbital-acetaminophen-caffeine (FIORICET, ESGIC) -40 MG per tablet Take 1 tablet by mouth every 6 hours as needed for Migraine 10 tablet 3    famotidine (PEPCID) 20 MG tablet Take 1 tablet by mouth 2 times daily 120 tablet 0     No current facility-administered medications for this visit. Social History     Tobacco Use    Smoking status: Former Smoker     Packs/day: 1.00     Years: 9.00     Pack years: 9.00     Types: Cigarettes     Start date: 2017     Quit date: 2020     Years since quittin.3    Smokeless tobacco: Never Used   Substance Use Topics    Alcohol use: No        Objective:     Vitals:    03/15/21 1548 03/15/21 1559   BP: (!) 150/99 (!) 142/78   Pulse: 94    Temp: 97.4 °F (36.3 °C)    SpO2: 99%    Weight: (!) 352 lb 3.2 oz (159.8 kg)      Body mass index is 55.16 kg/m². Wt Readings from Last 3 Encounters:   03/15/21 (!) 352 lb 3.2 oz (159.8 kg)   20 (!) 331 lb (150.1 kg)   20 (!) 331 lb (150.1 kg)     BP Readings from Last 3 Encounters:   03/15/21 (!) 142/78   19 110/70   18 114/68       Physical exam:  Constitutional: she is oriented to person, place, and time. she appears well-developed and well-nourished. No distress. HENT:   Cardiovascular: Normal rate, regular rhythm, normal heart sounds and intact distal pulses. No murmur heard. Pulmonary/Chest: Effort normal and breath sounds normal. No stridor. No respiratory distress. she has no wheezes. she has no rales. sheexhibits no tenderness. Abdominal: Soft. Bowel sounds are normal. she exhibits no distension and no mass. There is tenderness in the left lower quadrant. There is no rebound and no guarding. Musculoskeletal: Normal range of motion. she exhibits no edema, tenderness or

## 2021-03-17 ENCOUNTER — TELEPHONE (OUTPATIENT)
Dept: FAMILY MEDICINE CLINIC | Age: 31
End: 2021-03-17

## 2021-03-17 NOTE — TELEPHONE ENCOUNTER
Mailbox isn't set up yet. Will try again later. Please read notes below, help schedule a hospital follow up and transfer to a ma for tcm.  Thanks

## 2021-03-18 RX ORDER — CEPHALEXIN 500 MG/1
CAPSULE ORAL
Status: ON HOLD | COMMUNITY
Start: 2021-03-16 | End: 2021-04-02 | Stop reason: ALTCHOICE

## 2021-03-18 RX ORDER — SULFAMETHOXAZOLE AND TRIMETHOPRIM 800; 160 MG/1; MG/1
TABLET ORAL
COMMUNITY
Start: 2021-03-16 | End: 2021-03-25

## 2021-03-18 NOTE — TELEPHONE ENCOUNTER
Put on 2 different antibiotics, both have been added to her chart now. Patient is taking them as advised. Does patient need ER f/u once the antiboitics are finished? Please advise.      Patient does not need TCM call as she was not admitted

## 2021-03-21 ENCOUNTER — PATIENT MESSAGE (OUTPATIENT)
Dept: FAMILY MEDICINE CLINIC | Age: 31
End: 2021-03-21

## 2021-03-22 RX ORDER — FAMOTIDINE 20 MG/1
TABLET, FILM COATED ORAL
Qty: 120 TABLET | Refills: 0 | Status: ON HOLD | OUTPATIENT
Start: 2021-03-22 | End: 2021-04-02 | Stop reason: SDUPTHER

## 2021-03-22 RX ORDER — SUCRALFATE 1 G/1
1 TABLET ORAL 3 TIMES DAILY
Qty: 60 TABLET | Refills: 0 | Status: SHIPPED | OUTPATIENT
Start: 2021-03-22 | End: 2021-03-25

## 2021-03-22 NOTE — TELEPHONE ENCOUNTER
From: Laz Garnett  To: Lydia Mccann MD  Sent: 3/21/2021 5:55 PM EDT  Subject: Non-Urgent Medical Question    Hello, I had a bad GERD day on Friday all day long. Saturday it was better but my throat has been dry and it's still dry today. I've tried throat lozenges but it just seems to stay dry. I've been drinking a lot of water as well. Any suggestions to help? My endoscopy is scheduled for April 2nd. Thank you.

## 2021-03-22 NOTE — TELEPHONE ENCOUNTER
Medication:   Requested Prescriptions     Pending Prescriptions Disp Refills    famotidine (PEPCID) 20 MG tablet [Pharmacy Med Name: FAMOTIDINE 20 MG TABLET] 120 tablet 0     Sig: TAKE ONE TABLET BY MOUTH TWICE A DAY        Last Filled:  1/7/2021 #120 Refills 0    Patient Phone Number: [phone] (home)     Last appt: 3/15/2021   Next appt: Visit date not found    Last OARRS: No flowsheet data found.

## 2021-03-23 ENCOUNTER — TELEPHONE (OUTPATIENT)
Dept: FAMILY MEDICINE CLINIC | Age: 31
End: 2021-03-23

## 2021-03-24 NOTE — PROGRESS NOTES
ENDOSCOPY PREOP PHONE INTERVIEW  INSTRUCTIONS:   All patients having a procedure with sedation / anesthesia are required to be Covid tested. You will need to quarantine from the time you are tested until your procedure.  Follow all instructions / preps given to you from your doctor's office. If you have not received these instructions yet, please call the office immediately.  Enter the MAIN entrance located on [address] and report to the desk on left side of the lobby. Arrival Time: 0730 for your procedure scheduled at: 0900   Bring your insurance & photo ID with you.  Dress comfortably. No lotion, powders or jewelry   Bring an accurate list of your medications with doses/ frequency with you day of the procedure, including over the counter medications. If you are taking blood thinners, ASA or diabetic medication, make sure to call Dr. Shamika Maldonado  or your PCP for instructions prior to your procedure.  Arrange for someone to be with you and sign you out & drive you home after your procedure.  We are allowing 1 visitor with you in the hospital.        If you have further questions, you may contact your Endoscopist's office or Pre Admission Testing staff at [facility]. 3/24/2021 .4:14 PM

## 2021-03-25 ENCOUNTER — OFFICE VISIT (OUTPATIENT)
Dept: FAMILY MEDICINE CLINIC | Age: 31
End: 2021-03-25
Payer: COMMERCIAL

## 2021-03-25 VITALS
WEIGHT: 293 LBS | TEMPERATURE: 97.3 F | DIASTOLIC BLOOD PRESSURE: 88 MMHG | OXYGEN SATURATION: 99 % | BODY MASS INDEX: 55.29 KG/M2 | HEART RATE: 94 BPM | SYSTOLIC BLOOD PRESSURE: 142 MMHG

## 2021-03-25 DIAGNOSIS — F41.9 ANXIETY: ICD-10-CM

## 2021-03-25 DIAGNOSIS — L73.9 FOLLICULITIS: Primary | ICD-10-CM

## 2021-03-25 PROCEDURE — 1036F TOBACCO NON-USER: CPT | Performed by: FAMILY MEDICINE

## 2021-03-25 PROCEDURE — G8484 FLU IMMUNIZE NO ADMIN: HCPCS | Performed by: FAMILY MEDICINE

## 2021-03-25 PROCEDURE — 99213 OFFICE O/P EST LOW 20 MIN: CPT | Performed by: FAMILY MEDICINE

## 2021-03-25 PROCEDURE — G8427 DOCREV CUR MEDS BY ELIG CLIN: HCPCS | Performed by: FAMILY MEDICINE

## 2021-03-25 PROCEDURE — G8417 CALC BMI ABV UP PARAM F/U: HCPCS | Performed by: FAMILY MEDICINE

## 2021-03-25 RX ORDER — BACITRACIN, NEOMYCIN, POLYMYXIN B 400; 3.5; 5 [USP'U]/G; MG/G; [USP'U]/G
OINTMENT TOPICAL
Qty: 30 G | Refills: 0 | Status: SHIPPED | OUTPATIENT
Start: 2021-03-25 | End: 2021-03-30 | Stop reason: SDUPTHER

## 2021-03-25 NOTE — PROGRESS NOTES
Anxiety for up to 30 days. 30 tablet 0    ondansetron (ZOFRAN) 4 MG tablet Take 1 tablet by mouth daily as needed for Nausea or Vomiting 30 tablet 0    vitamin D (ERGOCALCIFEROL) 1.25 MG (00811 UT) CAPS capsule Take 1 capsule by mouth once a week 4 capsule 5    butalbital-acetaminophen-caffeine (FIORICET, ESGIC) -40 MG per tablet Take 1 tablet by mouth every 6 hours as needed for Migraine 10 tablet 3     No current facility-administered medications for this visit. Social History     Tobacco Use    Smoking status: Former Smoker     Packs/day: 1.00     Years: 9.00     Pack years: 9.00     Types: Cigarettes     Start date: 2017     Quit date: 2020     Years since quittin.3    Smokeless tobacco: Never Used   Substance Use Topics    Alcohol use: No        Objective:     Vitals:    21 1529 21 1610   BP: (!) 188/118 (!) 142/88   Pulse: 94    Temp: 97.3 °F (36.3 °C)    SpO2: 99%    Weight: (!) 353 lb (160.1 kg)      Body mass index is 55.29 kg/m². Wt Readings from Last 3 Encounters:   21 (!) 353 lb (160.1 kg)   03/15/21 (!) 352 lb 3.2 oz (159.8 kg)   20 (!) 331 lb (150.1 kg)     BP Readings from Last 3 Encounters:   21 (!) 142/88   03/15/21 (!) 142/78   19 110/70       Physical exam:  Constitutional: she is oriented to person, place, and time. she appears well-developed and well-nourished. No distress. Cardiovascular: Normal rate, regular rhythm, normal heart sounds and intact distal pulses. No murmur heard. Pulmonary/Chest: Effort normal and breath sounds normal. No stridor. No respiratory distress. she has no wheezes. she has no rales. sheexhibits no tenderness. Abdominal: Soft. Bowel sounds are normal. she exhibits no distension and no mass. There is no tenderness. There is no rebound and no guarding. Musculoskeletal: Normal range of motion. she exhibits no edema, tenderness or deformity. Lymphadenopathy:     she has no cervical adenopathy. Neurological:she is alert and oriented to person, place, and time. she has gross neurological exam normal with normal strength and normal gait  Skin: folliculitis  Psychiatric: she has a normal mood and affect. her   behavior is normal.      Assessment/Plan:   1. Folliculitis  - neomycin-bacitracin-polymyxin (NEOSPORIN) 400-5-5000 ointment; Apply topically 2 times daily. Dispense: 30 g; Refill: 0    2.  Anxiety  Advised to start taking vistaril tid  And her symptoms of intermittent throbbing type of pain and tingling in her left arm and fluctuating blood pressure are mostly anxiety related      Lizzy Montiel  3/25/2021 4:33 PM

## 2021-03-26 ENCOUNTER — PATIENT MESSAGE (OUTPATIENT)
Dept: FAMILY MEDICINE CLINIC | Age: 31
End: 2021-03-26

## 2021-03-26 DIAGNOSIS — G44.021 INTRACTABLE CHRONIC CLUSTER HEADACHE: ICD-10-CM

## 2021-03-26 NOTE — TELEPHONE ENCOUNTER
Medication:   Requested Prescriptions     Pending Prescriptions Disp Refills    butalbital-acetaminophen-caffeine (FIORICET, ESGIC) -40 MG per tablet 10 tablet 3     Sig: Take 1 tablet by mouth every 6 hours as needed for Migraine        Last Filled:  2/24/2021 10 tabs 3 refills     Patient Phone Number: [phone] (home)     Last appt: 3/25/2021   Next appt: Visit date not found    Last OARRS: No flowsheet data found.

## 2021-03-26 NOTE — TELEPHONE ENCOUNTER
From: Darleen Paris  To: Breanna Anthony MD  Sent: 3/26/2021 4:32 PM EDT  Subject: Non-Urgent Medical Question    Can you please send more refills for the migraine medicine.

## 2021-03-27 ENCOUNTER — NURSE ONLY (OUTPATIENT)
Dept: PRIMARY CARE CLINIC | Age: 31
End: 2021-03-27
Payer: COMMERCIAL

## 2021-03-27 DIAGNOSIS — Z01.818 PRE-OP EXAMINATION: Primary | ICD-10-CM

## 2021-03-27 PROCEDURE — 99421 OL DIG E/M SVC 5-10 MIN: CPT | Performed by: NURSE PRACTITIONER

## 2021-03-28 LAB — SARS-COV-2, PCR: NOT DETECTED

## 2021-03-29 DIAGNOSIS — L73.9 FOLLICULITIS: ICD-10-CM

## 2021-03-29 DIAGNOSIS — G44.021 INTRACTABLE CHRONIC CLUSTER HEADACHE: ICD-10-CM

## 2021-03-29 NOTE — TELEPHONE ENCOUNTER
Medication:   Requested Prescriptions     Pending Prescriptions Disp Refills    neomycin-bacitracin-polymyxin (NEOSPORIN) 400-5-5000 ointment 30 g 0     Sig: Apply topically 2 times daily.  butalbital-acetaminophen-caffeine (FIORICET, ESGIC) -40 MG per tablet 10 tablet 3     Sig: Take 1 tablet by mouth every 6 hours as needed for Migraine        Last Filled:  3/25/2021 30 g Refills 0  2/24/2021 #10 Refills 3    Patient Phone Number: [phone] (home)     Last appt: 3/25/2021   Next appt: Visit date not found    Last OARRS: No flowsheet data found.

## 2021-03-29 NOTE — TELEPHONE ENCOUNTER
----- Message from Talita Valentine sent at 3/29/2021  4:00 PM EDT -----  Subject: Message to Provider    QUESTIONS  Information for Provider? Patient called in to find out about her refills   for migraine medication and antibiotic ointment. Please call  ---------------------------------------------------------------------------  --------------  CALL BACK INFO  What is the best way for the office to contact you? OK to leave message on   voicemail  Preferred Call Back Phone Number? [phone]  ---------------------------------------------------------------------------  --------------  SCRIPT ANSWERS  Relationship to Patient?  Self

## 2021-03-30 RX ORDER — BACITRACIN, NEOMYCIN, POLYMYXIN B 400; 3.5; 5 [USP'U]/G; MG/G; [USP'U]/G
OINTMENT TOPICAL
Qty: 30 G | Refills: 0 | Status: SHIPPED | OUTPATIENT
Start: 2021-03-30 | End: 2021-04-08

## 2021-03-30 RX ORDER — BUTALBITAL, ACETAMINOPHEN AND CAFFEINE 50; 325; 40 MG/1; MG/1; MG/1
1 TABLET ORAL EVERY 6 HOURS PRN
Qty: 10 TABLET | Refills: 3 | Status: SHIPPED | OUTPATIENT
Start: 2021-03-30 | End: 2021-05-17

## 2021-04-01 ENCOUNTER — ANESTHESIA EVENT (OUTPATIENT)
Dept: ENDOSCOPY | Age: 31
End: 2021-04-01
Payer: COMMERCIAL

## 2021-04-02 ENCOUNTER — HOSPITAL ENCOUNTER (OUTPATIENT)
Age: 31
Setting detail: OUTPATIENT SURGERY
Discharge: HOME | End: 2021-04-02
Attending: INTERNAL MEDICINE | Admitting: INTERNAL MEDICINE
Payer: COMMERCIAL

## 2021-04-02 ENCOUNTER — ANESTHESIA (OUTPATIENT)
Dept: ENDOSCOPY | Age: 31
End: 2021-04-02
Payer: COMMERCIAL

## 2021-04-02 VITALS — DIASTOLIC BLOOD PRESSURE: 85 MMHG | OXYGEN SATURATION: 100 % | SYSTOLIC BLOOD PRESSURE: 140 MMHG

## 2021-04-02 VITALS
HEIGHT: 66 IN | DIASTOLIC BLOOD PRESSURE: 75 MMHG | WEIGHT: 293 LBS | RESPIRATION RATE: 16 BRPM | SYSTOLIC BLOOD PRESSURE: 128 MMHG | OXYGEN SATURATION: 97 % | HEART RATE: 72 BPM | BODY MASS INDEX: 47.09 KG/M2 | TEMPERATURE: 97 F

## 2021-04-02 DIAGNOSIS — K21.9 CHRONIC GASTROESOPHAGEAL REFLUX DISEASE: ICD-10-CM

## 2021-04-02 LAB — PREGNANCY, URINE: NEGATIVE

## 2021-04-02 PROCEDURE — 2500000003 HC RX 250 WO HCPCS: Performed by: NURSE ANESTHETIST, CERTIFIED REGISTERED

## 2021-04-02 PROCEDURE — 84703 CHORIONIC GONADOTROPIN ASSAY: CPT

## 2021-04-02 PROCEDURE — 2580000003 HC RX 258: Performed by: ANESTHESIOLOGY

## 2021-04-02 PROCEDURE — 7100000010 HC PHASE II RECOVERY - FIRST 15 MIN: Performed by: INTERNAL MEDICINE

## 2021-04-02 PROCEDURE — 3700000001 HC ADD 15 MINUTES (ANESTHESIA): Performed by: INTERNAL MEDICINE

## 2021-04-02 PROCEDURE — 88305 TISSUE EXAM BY PATHOLOGIST: CPT

## 2021-04-02 PROCEDURE — 3700000000 HC ANESTHESIA ATTENDED CARE: Performed by: INTERNAL MEDICINE

## 2021-04-02 PROCEDURE — 2709999900 HC NON-CHARGEABLE SUPPLY: Performed by: INTERNAL MEDICINE

## 2021-04-02 PROCEDURE — 3609015300 HC ESOPHAGEAL DILATION MALONEY: Performed by: INTERNAL MEDICINE

## 2021-04-02 PROCEDURE — 3609012400 HC EGD TRANSORAL BIOPSY SINGLE/MULTIPLE: Performed by: INTERNAL MEDICINE

## 2021-04-02 PROCEDURE — 88342 IMHCHEM/IMCYTCHM 1ST ANTB: CPT

## 2021-04-02 PROCEDURE — 6360000002 HC RX W HCPCS: Performed by: NURSE ANESTHETIST, CERTIFIED REGISTERED

## 2021-04-02 PROCEDURE — 7100000011 HC PHASE II RECOVERY - ADDTL 15 MIN: Performed by: INTERNAL MEDICINE

## 2021-04-02 RX ORDER — FAMOTIDINE 20 MG/1
20 TABLET, FILM COATED ORAL NIGHTLY
Qty: 120 TABLET | Refills: 2 | Status: SHIPPED | OUTPATIENT
Start: 2021-04-02 | End: 2021-05-17

## 2021-04-02 RX ORDER — BRIVARACETAM 50 MG/1
TABLET, FILM COATED ORAL NIGHTLY
COMMUNITY

## 2021-04-02 RX ORDER — SODIUM CHLORIDE, SODIUM LACTATE, POTASSIUM CHLORIDE, CALCIUM CHLORIDE 600; 310; 30; 20 MG/100ML; MG/100ML; MG/100ML; MG/100ML
INJECTION, SOLUTION INTRAVENOUS CONTINUOUS
Status: DISCONTINUED | OUTPATIENT
Start: 2021-04-02 | End: 2021-04-02 | Stop reason: HOSPADM

## 2021-04-02 RX ORDER — DEXAMETHASONE SODIUM PHOSPHATE 4 MG/ML
INJECTION, SOLUTION INTRA-ARTICULAR; INTRALESIONAL; INTRAMUSCULAR; INTRAVENOUS; SOFT TISSUE PRN
Status: DISCONTINUED | OUTPATIENT
Start: 2021-04-02 | End: 2021-04-02 | Stop reason: SDUPTHER

## 2021-04-02 RX ORDER — SODIUM CHLORIDE 0.9 % (FLUSH) 0.9 %
10 SYRINGE (ML) INJECTION EVERY 12 HOURS SCHEDULED
Status: DISCONTINUED | OUTPATIENT
Start: 2021-04-02 | End: 2021-04-02 | Stop reason: HOSPADM

## 2021-04-02 RX ORDER — LIDOCAINE HYDROCHLORIDE 10 MG/ML
1 INJECTION, SOLUTION EPIDURAL; INFILTRATION; INTRACAUDAL; PERINEURAL
Status: DISCONTINUED | OUTPATIENT
Start: 2021-04-02 | End: 2021-04-02 | Stop reason: HOSPADM

## 2021-04-02 RX ORDER — PANTOPRAZOLE SODIUM 40 MG/1
40 TABLET, DELAYED RELEASE ORAL
Qty: 30 TABLET | Refills: 2 | Status: SHIPPED | OUTPATIENT
Start: 2021-04-02 | End: 2021-10-27

## 2021-04-02 RX ORDER — GLYCOPYRROLATE 0.2 MG/ML
INJECTION INTRAMUSCULAR; INTRAVENOUS PRN
Status: DISCONTINUED | OUTPATIENT
Start: 2021-04-02 | End: 2021-04-02 | Stop reason: SDUPTHER

## 2021-04-02 RX ORDER — KETAMINE HYDROCHLORIDE 50 MG/ML
INJECTION, SOLUTION, CONCENTRATE INTRAMUSCULAR; INTRAVENOUS PRN
Status: DISCONTINUED | OUTPATIENT
Start: 2021-04-02 | End: 2021-04-02 | Stop reason: SDUPTHER

## 2021-04-02 RX ORDER — ONDANSETRON 2 MG/ML
INJECTION INTRAMUSCULAR; INTRAVENOUS PRN
Status: DISCONTINUED | OUTPATIENT
Start: 2021-04-02 | End: 2021-04-02 | Stop reason: SDUPTHER

## 2021-04-02 RX ORDER — SODIUM CHLORIDE 0.9 % (FLUSH) 0.9 %
10 SYRINGE (ML) INJECTION PRN
Status: DISCONTINUED | OUTPATIENT
Start: 2021-04-02 | End: 2021-04-02 | Stop reason: HOSPADM

## 2021-04-02 RX ORDER — MIDAZOLAM HYDROCHLORIDE 1 MG/ML
INJECTION INTRAMUSCULAR; INTRAVENOUS PRN
Status: DISCONTINUED | OUTPATIENT
Start: 2021-04-02 | End: 2021-04-02 | Stop reason: SDUPTHER

## 2021-04-02 RX ORDER — PROPOFOL 10 MG/ML
INJECTION, EMULSION INTRAVENOUS PRN
Status: DISCONTINUED | OUTPATIENT
Start: 2021-04-02 | End: 2021-04-02 | Stop reason: SDUPTHER

## 2021-04-02 RX ORDER — PROPOFOL 10 MG/ML
INJECTION, EMULSION INTRAVENOUS CONTINUOUS PRN
Status: DISCONTINUED | OUTPATIENT
Start: 2021-04-02 | End: 2021-04-02 | Stop reason: SDUPTHER

## 2021-04-02 RX ORDER — FENTANYL CITRATE 50 UG/ML
INJECTION, SOLUTION INTRAMUSCULAR; INTRAVENOUS PRN
Status: DISCONTINUED | OUTPATIENT
Start: 2021-04-02 | End: 2021-04-02 | Stop reason: SDUPTHER

## 2021-04-02 RX ADMIN — PROPOFOL 50 MCG/KG/MIN: 10 INJECTION, EMULSION INTRAVENOUS at 09:34

## 2021-04-02 RX ADMIN — GLYCOPYRROLATE 0.2 MG: 0.2 INJECTION, SOLUTION INTRAMUSCULAR; INTRAVENOUS at 09:32

## 2021-04-02 RX ADMIN — MIDAZOLAM HYDROCHLORIDE 2 MG: 2 INJECTION, SOLUTION INTRAMUSCULAR; INTRAVENOUS at 09:32

## 2021-04-02 RX ADMIN — FENTANYL CITRATE 100 MCG: 50 INJECTION, SOLUTION INTRAMUSCULAR; INTRAVENOUS at 09:32

## 2021-04-02 RX ADMIN — DEXAMETHASONE SODIUM PHOSPHATE 4 MG: 4 INJECTION, SOLUTION INTRAMUSCULAR; INTRAVENOUS at 09:32

## 2021-04-02 RX ADMIN — SODIUM CHLORIDE, POTASSIUM CHLORIDE, SODIUM LACTATE AND CALCIUM CHLORIDE: 600; 310; 30; 20 INJECTION, SOLUTION INTRAVENOUS at 08:48

## 2021-04-02 RX ADMIN — ONDANSETRON 4 MG: 2 INJECTION INTRAMUSCULAR; INTRAVENOUS at 09:32

## 2021-04-02 RX ADMIN — KETAMINE HYDROCHLORIDE 30 MG: 50 INJECTION, SOLUTION INTRAMUSCULAR; INTRAVENOUS at 09:35

## 2021-04-02 RX ADMIN — PROPOFOL 30 MG: 10 INJECTION, EMULSION INTRAVENOUS at 09:37

## 2021-04-02 ASSESSMENT — PULMONARY FUNCTION TESTS
PIF_VALUE: 1

## 2021-04-02 ASSESSMENT — PAIN - FUNCTIONAL ASSESSMENT: PAIN_FUNCTIONAL_ASSESSMENT: 0-10

## 2021-04-02 ASSESSMENT — LIFESTYLE VARIABLES: SMOKING_STATUS: 1

## 2021-04-02 NOTE — OP NOTE
[address] [place] [address]  Esophagogastroduodenoscopy Note        Patient: Daymon Boxer  : 1990     Procedure: Esophagogastroduodenoscopy with biopsy, alyssa dljuan. Date:  2021     Anesthesia: MAC    Indications: Dysphagia, GERD. Description of Procedure:  Informed consent was obtained from the patient after explanation of indications, benefits and possible risks and complications of the procedure. The procedure was done at bedside. Patient was placed in the left lateral decubitus position and the above IV sedation was administrered. The Olympus videoendoscope was placed in the patient's mouth and under direct visualization passed into the esophagus. The scope was then advanced into the stomach and then into the second portion of the duodenum. · The duodenum showed no abnormalities   · The stomach showed multiple gastric erosions in the antrum. Biopsies taken. · Esophagus showed irregular Z line, biopsies taken. · Although there was no obvious stricture in the esophagus, given her symptoms, esophagus was dilated to 54 Fr with shah dilator. Retroflexed views of the cardia and fundus showed no other abnormalities. The scope was anteflexed and the stomach was decompressed, and the scope was completely withdrawn. The patient tolerated the procedure well. EBL: Minimal    Impression:     Irregular Z line.  Gastric erosions. Recommendations:      PPI   Call for biopsy results in 10 days.     Jolynn Hernandes MD  [facility]

## 2021-04-02 NOTE — H&P
History and Physical / Pre-Sedation Assessment      PMHx:   Past Medical History:   Diagnosis Date    Anemia     Anxiety     Breast cyst     pt. states she has condition that she gets lumps in breasts    Depression     Epilepsy ([address]     last gran mal age 4; focalized seizure of a limb 2/2021    Fibrocystic breast     Legally blind     left eye only    Ovarian tumor     benign    Pseudotumor cerebri     cerebri optic neuritis    Thyroid disease     hypo       Medications:   Prior to Admission medications    Medication Sig Start Date End Date Taking? Authorizing Provider   Nutritional Supplements (EQUATE PO) Take by mouth daily   Yes Historical Provider, MD   Brivaracetam (BRIVIACT) 50 MG TABS Take by mouth nightly. Yes Historical Provider, MD   butalbital-acetaminophen-caffeine (FIORICET, ESGIC) -66 MG per tablet Take 1 tablet by mouth every 6 hours as needed for Migraine 3/30/21 4/9/21 Yes Siva Jacobsen MD   neomycin-bacitracin-polymyxin (NEOSPORIN) 400-5-5000 ointment Apply topically 2 times daily. 3/30/21 4/8/21 Yes Siva Jacobsen MD   famotidine (PEPCID) 20 MG tablet TAKE ONE TABLET BY MOUTH TWICE A DAY 3/22/21  Yes Siva Jacobsen MD   butalbital-acetaminophen-caffeine (FIORICET, ESGIC) -35 MG per tablet Take 1 tablet by mouth every 6 hours as needed for Migraine 3/30/21 4/9/21  Siva Jacobsen MD   hydrOXYzine (VISTARIL) 25 MG capsule Take 1 capsule by mouth 3 times daily as needed for Anxiety 3/15/21 4/14/21  Siva Jacobsen MD   LORazepam (ATIVAN) 0.5 MG tablet Take 1 tablet by mouth 2 times daily as needed for Anxiety for up to 30 days.  3/15/21 4/14/21  Siva Jacobsen MD   ondansetron (ZOFRAN) 4 MG tablet Take 1 tablet by mouth daily as needed for Nausea or Vomiting 3/15/21   Siva Jacobsen MD   vitamin D (ERGOCALCIFEROL) 1.25 MG (20115 UT) CAPS capsule Take 1 capsule by mouth once a week  Patient taking differently: Take 50,000 Units by mouth every 14 days 20   Jabari Flannery MD       Allergies:    Allergies   Allergen Reactions    Food Rash     Mushrooms       PSHx:   Past Surgical History:   Procedure Laterality Date    BREAST LUMPECTOMY      BREAST SURGERY      lump removal    HYSTEROSCOPY  2017    with D&C    LAPAROTOMY      OVARY SURGERY Right 2011    right ovary and tube removed for benign tumor       Social Hx:   Social History     Socioeconomic History    Marital status:      Spouse name: Not on file    Number of children: Not on file    Years of education: Not on file    Highest education level: Not on file   Occupational History    Not on file   Social Needs    Financial resource strain: Somewhat hard    Food insecurity     Worry: Never true     Inability: Never true    Transportation needs     Medical: No     Non-medical: No   Tobacco Use    Smoking status: Former Smoker     Packs/day: 1.00     Years: 9.00     Pack years: 9.00     Types: Cigarettes     Start date: 2017     Quit date: 2020     Years since quittin.3    Smokeless tobacco: Never Used   Substance and Sexual Activity    Alcohol use: No    Drug use: No    Sexual activity: Yes     Partners: Male   Lifestyle    Physical activity     Days per week: Not on file     Minutes per session: Not on file    Stress: Not on file   Relationships    Social connections     Talks on phone: Not on file     Gets together: Not on file     Attends Buddhism service: Not on file     Active member of club or organization: Not on file     Attends meetings of clubs or organizations: Not on file     Relationship status: Not on file    Intimate partner violence     Fear of current or ex partner: Not on file     Emotionally abused: Not on file     Physically abused: Not on file     Forced sexual activity: Not on file   Other Topics Concern    Not on file   Social History Narrative    Not on file       Family Hx:   Family History   Problem Relation Age of Onset    Arthritis Mother     Cancer Mother     High Blood Pressure Mother     Cancer Father     Depression Father     Diabetes Father     Kidney Disease Father     Mental Illness Father     Vision Loss Sister         Sister is legally blind in her L eye    Learning Disabilities Sister     Mental Illness Sister     Birth Defects Brother     Heart Disease Brother     Heart Disease Paternal Uncle     Stroke Paternal Uncle     Cancer Maternal Grandmother     Cancer Maternal Grandfather     Cancer Paternal Grandmother     Heart Disease Paternal Grandmother     High Cholesterol Paternal Grandmother     Stroke Paternal Grandmother     Cancer Paternal Grandfather     Stroke Paternal Grandfather     Miscarriages / Stillbirths Paternal Cousin        Physical Exam:  Vital Signs: /82   Pulse 67   Temp 97.9 °F (36.6 °C) (Temporal)   Resp 16   Ht 5' 6\" (1.676 m)   Wt (!) 350 lb (158.8 kg)   SpO2 97%   BMI 56.49 kg/m²    Airway: Mallampati: II (soft palate, uvula, fauces visible)  Pulmonary:Normal  Cardiac:Normal  Abdomen:Normal    Pre-Procedure Assessment / Plan:  ASA: Class 2 - A normal healthy patient with mild systemic disease  Level of Sedation Plan:Deep sedation  Post Procedure plan: Return to same level of care    I assessed the patient and find that the patient is in satisfactory condition to proceed with the planned procedure and sedation plan. I have explained the risk, benefits, and alternatives to the procedure; the patient understands and agrees to proceed.        Ange Ambriz  4/2/2021

## 2021-04-02 NOTE — ANESTHESIA PRE PROCEDURE
Department of Anesthesiology  Preprocedure Note       Name:  Jennifer Turcios   Age:  28 y.o.  :  1990                                          MRN:  [id number]         Date:  2021      Surgeon: Dany Nicole):  Tanesha Zhu MD    Procedure: Procedure(s):  ESOPHAGOGASTRODUODENOSCOPY    Medications prior to admission:   Prior to Admission medications    Medication Sig Start Date End Date Taking? Authorizing Provider   Nutritional Supplements (EQUATE PO) Take by mouth daily   Yes Historical Provider, MD   Brivaracetam (BRIVIACT) 50 MG TABS Take by mouth nightly. Yes Historical Provider, MD   butalbital-acetaminophen-caffeine (FIORICET, ESGIC) -20 MG per tablet Take 1 tablet by mouth every 6 hours as needed for Migraine 3/30/21 4/9/21 Yes Breanna Armas MD   neomycin-bacitracin-polymyxin (NEOSPORIN) 400-5-5000 ointment Apply topically 2 times daily. 3/30/21 4/8/21 Yes Breanna Armas MD   famotidine (PEPCID) 20 MG tablet TAKE ONE TABLET BY MOUTH TWICE A DAY 3/22/21  Yes Breanna Armas MD   butalbital-acetaminophen-caffeine (FIORICET, ESGIC) -84 MG per tablet Take 1 tablet by mouth every 6 hours as needed for Migraine 3/30/21 4/9/21  Breanna Armas MD   hydrOXYzine (VISTARIL) 25 MG capsule Take 1 capsule by mouth 3 times daily as needed for Anxiety 3/15/21 4/14/21  Breanna Armas MD   LORazepam (ATIVAN) 0.5 MG tablet Take 1 tablet by mouth 2 times daily as needed for Anxiety for up to 30 days.  3/15/21 4/14/21  Breanna Armas MD   ondansetron (ZOFRAN) 4 MG tablet Take 1 tablet by mouth daily as needed for Nausea or Vomiting 3/15/21   Breanna Armas MD   vitamin D (ERGOCALCIFEROL) 1.25 MG (15524 UT) CAPS capsule Take 1 capsule by mouth once a week  Patient taking differently: Take 50,000 Units by mouth every 14 days  20   Breanna Armas MD       Current medications:    Current Facility-Administered Medications   Medication Dose Route Frequency Provider Last Signs (Current):   Vitals:    04/02/21 0808   BP: 138/82   Pulse: 67   Resp: 16   Temp: 97.9 °F (36.6 °C)   TempSrc: Temporal   SpO2: 97%   Weight: (!) 350 lb (158.8 kg)   Height: 5' 6\" (1.676 m)                                              BP Readings from Last 3 Encounters:   04/02/21 138/82   03/25/21 (!) 142/88   03/15/21 (!) 142/78       NPO Status: Time of last liquid consumption: 2300                        Time of last solid consumption: 2300                                                      BMI:   Wt Readings from Last 3 Encounters:   04/02/21 (!) 350 lb (158.8 kg)   03/25/21 (!) 353 lb (160.1 kg)   03/15/21 (!) 352 lb 3.2 oz (159.8 kg)     Body mass index is 56.49 kg/m². CBC:   Lab Results   Component Value Date    WBC 7.0 01/27/2021    RBC 4.49 01/27/2021    HGB 13.4 01/27/2021    HCT 39.7 01/27/2021    MCV 88.6 01/27/2021    RDW 13.3 01/27/2021     01/27/2021       CMP:   Lab Results   Component Value Date     01/27/2021    K 4.1 01/27/2021     01/27/2021    CO2 28 01/27/2021    BUN 8 01/27/2021    CREATININE 0.9 01/27/2021    GFRAA >60 01/27/2021    AGRATIO 1.4 01/27/2021    LABGLOM >60 01/27/2021    GLUCOSE 88 01/27/2021    PROT 7.4 01/27/2021    CALCIUM 10.1 01/27/2021    BILITOT <0.2 01/27/2021    ALKPHOS 77 01/27/2021    AST 18 01/27/2021    ALT 23 01/27/2021       POC Tests: No results for input(s): POCGLU, POCNA, POCK, POCCL, POCBUN, POCHEMO, POCHCT in the last 72 hours.     Coags: No results found for: PROTIME, INR, APTT    HCG (If Applicable):   Lab Results   Component Value Date    PREGTESTUR Negative 04/02/2021        ABGs: No results found for: PHART, PO2ART, JJD2ESU, RSF7EBZ, BEART, W3UPTAZD     Type & Screen (If Applicable):  No results found for: LABABO, LABRH    Drug/Infectious Status (If Applicable):  No results found for: HIV, HEPCAB    COVID-19 Screening (If Applicable):   Lab Results   Component Value Date    COVID19 Not Detected 03/27/2021           Anesthesia Evaluation  Patient summary reviewed and Nursing notes reviewed no history of anesthetic complications:   Airway: Mallampati: II  TM distance: >3 FB   Neck ROM: full  Mouth opening: > = 3 FB Dental:          Pulmonary: breath sounds clear to auscultation  (+) sleep apnea: on CPAP,  current smoker (quit 11/2020  9 pk yrs)                           Cardiovascular:  Exercise tolerance: good (>4 METS),       (-) past MI    NYHA Classification: II    Rhythm: regular  Rate: normal           Beta Blocker:  Not on Beta Blocker         Neuro/Psych:                ROS comment: Pseudotumor cerebri    No sz since age 4      GI/Hepatic/Renal:   (+) GERD: poorly controlled, morbid obesity         ROS comment: gerd on pepcid with break through. Endo/Other:    (+) hypothyroidism::., .                  ROS comment: Polycystic ovarian syndrome  Abdominal:   (+) obese,         Vascular:                                        Anesthesia Plan      MAC     ASA 3       Induction: intravenous. MIPS: Prophylactic antiemetics administered. Anesthetic plan and risks discussed with patient. Plan discussed with CRNA.     Attending anesthesiologist reviewed and agrees with Pre Eval content              Roberto Hutton DO   4/2/2021

## 2021-04-02 NOTE — PROGRESS NOTES
Ambulatory Surgery/Procedure Discharge Note    Pt tolerated procedure well. Denies pain or nausea post procedure. Reviewed instructions with pt and . Pt has multiple questions and continues to ask multiple times although reviewed every time. Physician spoke to pt at two separate times post procedure to alveate concerns. Written prescriptions provided with written discharge instructions. Discharged in wheelchair to lobby level.  to drive home. Vitals:    04/02/21 1019   BP: 128/75   Pulse: 72   Resp: 16   Temp:    SpO2: 97%       In: 420 [P.O.:120; I.V.:300]  Out: -     Restroom use offered before discharge. Yes    Pain assessment:  none  Pain Level: 3        Patient discharged to home/self care.  Patient discharged via wheel chair by transporter to waiting family/S.O.       4/2/2021 10:37 AM

## 2021-04-02 NOTE — ANESTHESIA POSTPROCEDURE EVALUATION
Department of Anesthesiology  Postprocedure Note    Patient: Mariola Swartz  MRN: [id number]  YOB: 1990  Date of evaluation: 4/2/2021  Time:  10:10 AM     Procedure Summary     Date: 04/02/21 Room / Location: [address] [place] [address] Alexander Lyle  / [facility]    Anesthesia Start: 0930 Anesthesia Stop: 9192    Procedures:       EGD BIOPSY (N/A )      ESOPHAGEAL DILATION WALTON (N/A ) Diagnosis:       Chronic gastroesophageal reflux disease      (Chronic gastroesophageal reflux disease [K21.9])    Surgeons: Lynette Gonzalez MD Responsible Provider: Mely Whitt DO    Anesthesia Type: MAC ASA Status: 3          Anesthesia Type: MAC    Sherri Phase I: Sherri Score: 10    Sherri Phase II: Sherri Score: 9    Last vitals: Reviewed and per EMR flowsheets.        Anesthesia Post Evaluation    Patient location during evaluation: PACU  Patient participation: complete - patient participated  Level of consciousness: awake and alert  Pain score: 0  Airway patency: patent  Nausea & Vomiting: no nausea and no vomiting  Complications: no  Cardiovascular status: hemodynamically stable  Respiratory status: acceptable  Hydration status: stable

## 2021-04-06 ENCOUNTER — TELEPHONE (OUTPATIENT)
Dept: FAMILY MEDICINE CLINIC | Age: 31
End: 2021-04-06

## 2021-04-06 NOTE — TELEPHONE ENCOUNTER
Upper rib cage right under both breast. She is taking Pepcid and Protonix. Started after work, she thought she was sob but then the pain went away, came back while she was sleeping, once she woke up about noon and started being active the pain went away. [facility]. and was told if she had this symptom to call her pcp. She wants to know if this is normal after this procedure. Would you like an OV for an EKG?  Please advise, thanks

## 2021-04-11 ENCOUNTER — PATIENT MESSAGE (OUTPATIENT)
Dept: FAMILY MEDICINE CLINIC | Age: 31
End: 2021-04-11

## 2021-04-12 ENCOUNTER — TELEPHONE (OUTPATIENT)
Dept: BARIATRICS/WEIGHT MGMT | Age: 31
End: 2021-04-12

## 2021-04-12 DIAGNOSIS — R20.2 TINGLING OF BOTH UPPER EXTREMITIES: Primary | ICD-10-CM

## 2021-04-12 RX ORDER — SUCRALFATE 1 G/1
TABLET ORAL
Qty: 60 TABLET | Refills: 0 | Status: SHIPPED | OUTPATIENT
Start: 2021-04-12 | End: 2021-04-29

## 2021-04-12 NOTE — TELEPHONE ENCOUNTER
Called as a new pt courtesy call - spoke w patient. Did receive paperwork - told patient to have new pt paperwork completely filled out, insurance card, and id and to arrive at appt time. If they didn't have the paperwork filled out and arrive on time may be rescheduled.

## 2021-04-12 NOTE — TELEPHONE ENCOUNTER
Medication:   Requested Prescriptions     Pending Prescriptions Disp Refills    sucralfate (CARAFATE) 1 GM tablet [Pharmacy Med Name: SUCRALFATE 1 GM TABLET] 60 tablet 0     Sig: TAKE ONE TABLET BY MOUTH THREE TIMES A DAY        Last Filled:  3/22/2021 #60 Refills 0    Patient Phone Number: [phone] (home)     Last appt: 4/7/2021   Next appt: Visit date not found    Last OARRS: No flowsheet data found.

## 2021-04-22 ENCOUNTER — HOSPITAL ENCOUNTER (EMERGENCY)
Age: 31
Discharge: HOME | End: 2021-04-22
Payer: COMMERCIAL

## 2021-04-22 VITALS
SYSTOLIC BLOOD PRESSURE: 170 MMHG | BODY MASS INDEX: 45.99 KG/M2 | DIASTOLIC BLOOD PRESSURE: 100 MMHG | WEIGHT: 293 LBS | TEMPERATURE: 98.1 F | RESPIRATION RATE: 15 BRPM | HEIGHT: 67 IN | HEART RATE: 90 BPM | OXYGEN SATURATION: 98 %

## 2021-04-22 DIAGNOSIS — M79.604 RIGHT LEG PAIN: Primary | ICD-10-CM

## 2021-04-22 DIAGNOSIS — R79.89 ELEVATED D-DIMER: ICD-10-CM

## 2021-04-22 LAB
BASOPHILS ABSOLUTE: 0 K/UL (ref 0–0.2)
BASOPHILS RELATIVE PERCENT: 0.6 %
D DIMER: 309 NG/ML DDU (ref 0–229)
EOSINOPHILS ABSOLUTE: 0.1 K/UL (ref 0–0.6)
EOSINOPHILS RELATIVE PERCENT: 1.3 %
HCT VFR BLD CALC: 39.3 % (ref 36–48)
HEMOGLOBIN: 12.9 G/DL (ref 12–16)
LYMPHOCYTES ABSOLUTE: 2 K/UL (ref 1–5.1)
LYMPHOCYTES RELATIVE PERCENT: 26.4 %
MCH RBC QN AUTO: 29.4 PG (ref 26–34)
MCHC RBC AUTO-ENTMCNC: 32.8 G/DL (ref 31–36)
MCV RBC AUTO: 89.7 FL (ref 80–100)
MONOCYTES ABSOLUTE: 0.5 K/UL (ref 0–1.3)
MONOCYTES RELATIVE PERCENT: 7 %
NEUTROPHILS ABSOLUTE: 4.8 K/UL (ref 1.7–7.7)
NEUTROPHILS RELATIVE PERCENT: 64.7 %
PDW BLD-RTO: 13.2 % (ref 12.4–15.4)
PLATELET # BLD: 277 K/UL (ref 135–450)
PMV BLD AUTO: 7.6 FL (ref 5–10.5)
RBC # BLD: 4.39 M/UL (ref 4–5.2)
WBC # BLD: 7.5 K/UL (ref 4–11)

## 2021-04-22 PROCEDURE — 99283 EMERGENCY DEPT VISIT LOW MDM: CPT

## 2021-04-22 PROCEDURE — 85025 COMPLETE CBC W/AUTO DIFF WBC: CPT

## 2021-04-22 PROCEDURE — 36415 COLL VENOUS BLD VENIPUNCTURE: CPT

## 2021-04-22 PROCEDURE — 85379 FIBRIN DEGRADATION QUANT: CPT

## 2021-04-22 PROCEDURE — 6370000000 HC RX 637 (ALT 250 FOR IP): Performed by: NURSE PRACTITIONER

## 2021-04-22 RX ORDER — SULFAMETHOXAZOLE AND TRIMETHOPRIM 800; 160 MG/1; MG/1
1 TABLET ORAL 2 TIMES DAILY
COMMUNITY
End: 2021-05-17

## 2021-04-22 RX ADMIN — APIXABAN 10 MG: 5 TABLET, FILM COATED ORAL at 18:02

## 2021-04-22 ASSESSMENT — PAIN DESCRIPTION - ORIENTATION: ORIENTATION: RIGHT

## 2021-04-22 ASSESSMENT — PAIN DESCRIPTION - PAIN TYPE: TYPE: ACUTE PAIN

## 2021-04-22 ASSESSMENT — PAIN SCALES - GENERAL: PAINLEVEL_OUTOF10: 4

## 2021-04-22 NOTE — ED PROVIDER NOTES
[facility]        Pt Name: Jess Guerrero  MRN: [id number]  [place] 1990  Date of evaluation: 4/22/2021  Provider: HARVINDER Bell - FAHAD  PCP: Siva Jacobsen MD    URI. I have evaluated this patient. My supervising physician was available for consultation. CHIEF COMPLAINT       Chief Complaint   Patient presents with    Leg Pain     Pt to ER with R leg pain x1 week progressively worsening, denies injury, no obvious swelling, bruising, or deformity. Skin is intact. +palpable pedal pulse, ROM intact. Pt does report some swelling to bilat feet before it started. HISTORY OF PRESENT ILLNESS   (Location, Timing/Onset, Context/Setting, Quality, Duration, Modifying Factors, Severity, Associated Signs and Symptoms)  Note limiting factors. Jess Guerrero is a 28 y.o. female with medical history of anemia, anxiety, breast cyst, depression, epilepsy, legally blind left eye, ovarian tumor, pseudotumor cerebri with cerebri optic neuritis, hypothyroidism who presents the ED with complaints of right lateral distal calf pain that has been present x1 week. Patient did note the pain had subsided a few days ago and then had came back. Notes it has been constant. She denies any history of prior PE or DVTs. Does note that her mother had a history of PE. Patient does have an indwelling Mirena. She denies any recent hospitalizations or surgery in the past month. She denies any known history of any bleeding or clotting disorders. She denies any associated shortness of air, hemoptysis, cough, wheezing, chest pain, abdominal pain, leg swelling, dizzy, syncope, rashes, or fevers. She denies any recent trauma, accidents, injuries, or problems. Nursing Notes were all reviewed and agreed with or any disagreements were addressed in the HPI. ED visit 4/14/2021 for ganglion cyst of the left elbow.   ED visit 4/6/2021 for (PROTONIX) 40 MG TABLET    Take 1 tablet by mouth every morning (before breakfast)    SUCRALFATE (CARAFATE) 1 GM TABLET    TAKE ONE TABLET BY MOUTH THREE TIMES A DAY    SULFAMETHOXAZOLE-TRIMETHOPRIM (BACTRIM DS;SEPTRA DS) 800-160 MG PER TABLET    Take 1 tablet by mouth 2 times daily    VITAMIN D (ERGOCALCIFEROL) 1.25 MG (26295 UT) CAPS CAPSULE    Take 1 capsule by mouth once a week         ALLERGIES     Food    FAMILYHISTORY       Family History   Problem Relation Age of Onset    Arthritis Mother     Cancer Mother     High Blood Pressure Mother     Cancer Father     Depression Father     Diabetes Father     Kidney Disease Father     Mental Illness Father     Vision Loss Sister         Sister is legally blind in her L eye    Learning Disabilities Sister     Mental Illness Sister     Birth Defects Brother     Heart Disease Brother     Heart Disease Paternal Uncle     Stroke Paternal Uncle     Cancer Maternal Grandmother     Cancer Maternal Grandfather     Cancer Paternal Grandmother     Heart Disease Paternal Grandmother     High Cholesterol Paternal Grandmother     Stroke Paternal Grandmother     Cancer Paternal Grandfather     Stroke Paternal Grandfather     Miscarriages / Stillbirths Paternal Cousin           SOCIAL HISTORY       Social History     Tobacco Use    Smoking status: Former Smoker     Packs/day: 1.00     Years: 9.00     Pack years: 9.00     Types: Cigarettes     Start date: 2017     Quit date: 2020     Years since quittin.4    Smokeless tobacco: Never Used   Substance Use Topics    Alcohol use: No    Drug use: No       SCREENINGS             PHYSICAL EXAM    (up to 7 for level 4, 8 or more for level 5)     ED Triage Vitals [21 1654]   Enc Vitals Group      BP (!) 170/100      Pulse 90      Resp 15      Temp 98.1 °F (36.7 °C)      Temp Source Oral      SpO2 98 %      Weight (!) 350 lb (158.8 kg)      Height 5' 7\" (1.702 m)         Physical Exam  Vitals signs and nursing note reviewed. Constitutional:       General: She is awake. Appearance: Normal appearance. She is well-developed. She is morbidly obese. HENT:      Head: Normocephalic and atraumatic. Nose: Nose normal.   Eyes:      General:         Right eye: No discharge. Left eye: No discharge. Neck:      Musculoskeletal: Normal range of motion. Cardiovascular:      Rate and Rhythm: Normal rate and regular rhythm. Pulses:           Dorsalis pedis pulses are 2+ on the right side and 2+ on the left side. Heart sounds: Normal heart sounds. Pulmonary:      Effort: Pulmonary effort is normal. No respiratory distress. Breath sounds: Normal breath sounds. Abdominal:      General: Bowel sounds are normal.      Palpations: Abdomen is soft. Tenderness: There is no abdominal tenderness. Musculoskeletal: Normal range of motion. Right ankle: Normal. She exhibits normal range of motion. No tenderness. No lateral malleolus tenderness found. Achilles tendon normal. Achilles tendon exhibits normal Garcia's test results. Right lower leg: No edema. Left lower leg: No edema. Right foot: Normal. No tenderness or bony tenderness. Skin:     General: Skin is warm and dry. Coloration: Skin is not pale. Neurological:      General: No focal deficit present. Mental Status: She is alert and oriented to person, place, and time. Sensory: Sensation is intact. Gait: Gait is intact. Psychiatric:         Behavior: Behavior normal. Behavior is cooperative.          DIAGNOSTIC RESULTS   LABS:    Labs Reviewed   D-DIMER, QUANTITATIVE - Abnormal; Notable for the following components:       Result Value    D-Dimer, Quant 309 (*)     All other components within normal limits    Narrative:     Performed at:  [facility] [address], [facility], [address]   Phone [phone]   CBC WITH AUTO DIFFERENTIAL    Narrative: Performed at:  [facility]  [address], [address]   Phone [phone]       All other labs were within normal range or not returned as of this dictation. EKG: All EKG's are interpreted by the Emergency Department Physician in the absence of a cardiologist.  Please see their note for interpretation of EKG. RADIOLOGY:   Non-plain film images such as CT, Ultrasound and MRI are read by the radiologist. Plain radiographic images are visualized and preliminarily interpreted by the ED Provider with the below findings:        Interpretation per the Radiologist below, if available at the time of this note:    US DUP LOWER EXTREMITY RIGHT RAJ    (Results Pending)     No results found. PROCEDURES   Unless otherwise noted below, none     Procedures    CRITICAL CARE TIME   N/A    CONSULTS:  None      EMERGENCY DEPARTMENT COURSE and DIFFERENTIAL DIAGNOSIS/MDM:   Vitals:    Vitals:    04/22/21 1654   BP: (!) 170/100   Pulse: 90   Resp: 15   Temp: 98.1 °F (36.7 °C)   TempSrc: Oral   SpO2: 98%   Weight: (!) 350 lb (158.8 kg)   Height: 5' 7\" (1.702 m)       Patient was given the following medications:  Medications   apixaban (ELIQUIS) tablet 10 mg ( Oral Canceled Entry 4/22/21 1804)     Followed by   apixaban (ELIQUIS) tablet 10 mg (has no administration in time range)           Pertinent Labs & Imaging studies reviewed. (See chart for details)   -  Patient seen and evaluated in the emergency department. -  Triage and nursing notes reviewed and incorporated. -  Old chart records reviewed and incorporated.   -  Patient case is not discussed with attending physician,  although Dr. Deana Peña was available for consultation  -  Differential diagnosis includes: PAD, PVD, DVT, SVT, vasculitis, achilles tendon rupture, hypokalemia, rhabdo, vs COVID-19.  -  Work-up included:  See above CBC, D-dimer  -  ED treatment included:  Eliquis  - Consults: None  -  Results

## 2021-04-23 ENCOUNTER — TELEPHONE (OUTPATIENT)
Dept: PHARMACY | Age: 31
End: 2021-04-23

## 2021-04-23 ENCOUNTER — HOSPITAL ENCOUNTER (OUTPATIENT)
Age: 31
Setting detail: SPECIMEN
Discharge: HOME | End: 2021-04-23
Payer: COMMERCIAL

## 2021-04-23 ENCOUNTER — OFFICE VISIT (OUTPATIENT)
Dept: PULMONOLOGY | Age: 31
End: 2021-04-23
Payer: COMMERCIAL

## 2021-04-23 ENCOUNTER — HOSPITAL ENCOUNTER (OUTPATIENT)
Dept: CT IMAGING | Age: 31
Discharge: HOME | End: 2021-04-23
Payer: COMMERCIAL

## 2021-04-23 ENCOUNTER — HOSPITAL ENCOUNTER (OUTPATIENT)
Dept: VASCULAR LAB | Age: 31
Discharge: HOME | End: 2021-04-23
Payer: COMMERCIAL

## 2021-04-23 VITALS
SYSTOLIC BLOOD PRESSURE: 148 MMHG | DIASTOLIC BLOOD PRESSURE: 88 MMHG | OXYGEN SATURATION: 99 % | BODY MASS INDEX: 45.99 KG/M2 | HEIGHT: 67 IN | WEIGHT: 293 LBS | HEART RATE: 82 BPM

## 2021-04-23 DIAGNOSIS — R06.09 DOE (DYSPNEA ON EXERTION): Primary | ICD-10-CM

## 2021-04-23 DIAGNOSIS — R79.89 ELEVATED D-DIMER: ICD-10-CM

## 2021-04-23 DIAGNOSIS — M79.604 RIGHT LEG PAIN: ICD-10-CM

## 2021-04-23 DIAGNOSIS — R93.89 ABNORMAL COMPUTED TOMOGRAPHY ANGIOGRAPHY (CTA): Primary | ICD-10-CM

## 2021-04-23 DIAGNOSIS — R06.02 SOB (SHORTNESS OF BREATH): ICD-10-CM

## 2021-04-23 DIAGNOSIS — R79.89 ELEVATED D-DIMER: Primary | ICD-10-CM

## 2021-04-23 LAB
CREAT SERPL-MCNC: 0.9 MG/DL (ref 0.6–1.1)
GFR AFRICAN AMERICAN: >60
GFR NON-AFRICAN AMERICAN: >60

## 2021-04-23 PROCEDURE — G8417 CALC BMI ABV UP PARAM F/U: HCPCS | Performed by: INTERNAL MEDICINE

## 2021-04-23 PROCEDURE — 36415 COLL VENOUS BLD VENIPUNCTURE: CPT

## 2021-04-23 PROCEDURE — 93971 EXTREMITY STUDY: CPT

## 2021-04-23 PROCEDURE — G8427 DOCREV CUR MEDS BY ELIG CLIN: HCPCS | Performed by: INTERNAL MEDICINE

## 2021-04-23 PROCEDURE — 6360000004 HC RX CONTRAST MEDICATION: Performed by: FAMILY MEDICINE

## 2021-04-23 PROCEDURE — 82565 ASSAY OF CREATININE: CPT

## 2021-04-23 PROCEDURE — 71275 CT ANGIOGRAPHY CHEST: CPT

## 2021-04-23 PROCEDURE — 99243 OFF/OP CNSLTJ NEW/EST LOW 30: CPT | Performed by: INTERNAL MEDICINE

## 2021-04-23 RX ADMIN — IOPAMIDOL 80 ML: 755 INJECTION, SOLUTION INTRAVENOUS at 14:43

## 2021-04-23 ASSESSMENT — ENCOUNTER SYMPTOMS
CHEST TIGHTNESS: 1
ABDOMINAL PAIN: 0
VOICE CHANGE: 0
SHORTNESS OF BREATH: 1
ANAL BLEEDING: 0
BACK PAIN: 0
SORE THROAT: 0
STRIDOR: 0
APNEA: 0
CONSTIPATION: 0
BLOOD IN STOOL: 0
SINUS PRESSURE: 0
DIARRHEA: 0
COUGH: 0
ABDOMINAL DISTENTION: 0
RHINORRHEA: 0
CHOKING: 0
WHEEZING: 0

## 2021-04-23 NOTE — TELEPHONE ENCOUNTER
Khloe Edgar from vascular called with negative results for DVT. [facility] is on her way up to give the medication to us. No evidence of deep vein or superficial vein thrombosis involving the right   lower extremity and the left common femoral vein. Khloe Edgar called from vascular. Patient was in the ER on 4/23 and was given Eliquis, vascular results are negative for DVT. Spoke with patient and took back medication that was given to them. Disposed of at [address].      Maynor Liu, RayD, [facility]

## 2021-04-23 NOTE — PROGRESS NOTES
breakfast) 30 tablet 2    famotidine (PEPCID) 20 MG tablet Take 1 tablet by mouth nightly 120 tablet 2    ondansetron (ZOFRAN) 4 MG tablet Take 1 tablet by mouth daily as needed for Nausea or Vomiting 30 tablet 0    vitamin D (ERGOCALCIFEROL) 1.25 MG (89382 UT) CAPS capsule Take 1 capsule by mouth once a week (Patient taking differently: Take 50,000 Units by mouth every 14 days ) 4 capsule 5       Immunization History   Administered Date(s) Administered    Influenza, Quadv, IM, (6 mo and older Fluzone, Flulaval, Fluarix and 3 yrs and older Afluria) 01/05/2018    MMR 03/07/2016    Tdap (Boostrix, Adacel) 03/04/2015       Past Medical History:   Diagnosis Date    Anemia     Anxiety     Breast cyst     pt. states she has condition that she gets lumps in breasts    Depression     Epilepsy ([facility] [address]     last gran mal age 4; focalized seizure of a limb 2/2021    Fibrocystic breast     Legally blind     left eye only    Ovarian tumor     benign    Pseudotumor cerebri     cerebri optic neuritis    Thyroid disease     hypo     Past Surgical History:   Procedure Laterality Date    BREAST LUMPECTOMY      BREAST SURGERY      lump removal    ESOPHAGEAL DILATATION N/A 4/2/2021    ESOPHAGEAL DILATION [place] performed by Dipesh Schneider MD at [address]  08/18/2017    with D&C    LAPAROTOMY      OVARY SURGERY Right 2011    right ovary and tube removed for benign tumor    UPPER GASTROINTESTINAL ENDOSCOPY N/A 4/2/2021    EGD BIOPSY performed by Dipesh Schneider MD at [address] History   Problem Relation Age of Onset    Arthritis Mother     Cancer Mother     High Blood Pressure Mother     Cancer Father     Depression Father     Diabetes Father     Kidney Disease Father     Mental Illness Father     Vision Loss Sister         Sister is legally blind in her L eye    Learning Disabilities Sister     Mental Illness Sister     Birth Defects Brother     Heart Disease Brother     Heart Disease Paternal Uncle     Stroke Paternal Uncle     Cancer Maternal Grandmother     Cancer Maternal Grandfather     Cancer Paternal Grandmother     Heart Disease Paternal Grandmother     High Cholesterol Paternal Grandmother     Stroke Paternal Grandmother     Cancer Paternal Grandfather     Stroke Paternal Grandfather    Jessy Levee / Stillbirths Paternal Cousin        Review of Systems:  Review of Systems   Constitutional: Positive for fatigue and unexpected weight change. Negative for activity change, appetite change and fever. HENT: Negative for congestion, ear discharge, ear pain, postnasal drip, rhinorrhea, sinus pressure, sneezing, sore throat, tinnitus and voice change. Respiratory: Positive for chest tightness and shortness of breath. Negative for apnea, cough, choking, wheezing and stridor. Cardiovascular: Positive for chest pain. Negative for palpitations and leg swelling. Gastrointestinal: Negative for abdominal distention, abdominal pain, anal bleeding, blood in stool, constipation and diarrhea. Musculoskeletal: Negative for arthralgias, back pain and gait problem. Skin: Negative for pallor and rash. Allergic/Immunologic: Negative for environmental allergies. Neurological: Negative for dizziness, tremors, seizures, syncope, speech difficulty, weakness, light-headedness, numbness and headaches. Hematological: Negative for adenopathy. Does not bruise/bleed easily. Psychiatric/Behavioral: Positive for sleep disturbance. Vitals:    04/23/21 1631 04/23/21 1633   BP: (!) 148/88 (!) 148/88   Pulse: 82    SpO2: 99%    Weight: (!) 350 lb (158.8 kg)    Height: 5' 7\" (1.702 m)      Patient-Reported Vitals 4/7/2021   Patient-Reported Weight 350 lbs   Patient-Reported Height 5'7      Body mass index is 54.82 kg/m².      Wt Readings from Last 3 Encounters:   04/23/21 (!) 350 lb (158.8 kg)   04/22/21 (!) 350 lb (158.8 kg)   04/02/21 (!) 350 lb (158.8 kg)     BP Readings from Last 3 Encounters:   04/23/21 (!) 148/88   04/22/21 (!) 170/100   04/02/21 (!) 140/85         Physical Exam  Constitutional:       General: She is not in acute distress. Appearance: She is well-developed. She is not diaphoretic. HENT:      Mouth/Throat:      Pharynx: No oropharyngeal exudate. Cardiovascular:      Rate and Rhythm: Normal rate and regular rhythm. Heart sounds: Normal heart sounds. No murmur. Pulmonary:      Effort: No respiratory distress. Breath sounds: No wheezing or rales. Comments: Decreased air entry at both bases  Chest:      Chest wall: No tenderness. Abdominal:      General: There is no distension. Palpations: There is no mass. Tenderness: There is no abdominal tenderness. There is no guarding or rebound. Musculoskeletal:         General: No swelling, tenderness or deformity. Skin:     Coloration: Skin is not pale. Findings: No erythema or rash. Neurological:      Mental Status: She is alert and oriented to person, place, and time. Cranial Nerves: No cranial nerve deficit. Motor: No abnormal muscle tone. Coordination: Coordination normal.      Deep Tendon Reflexes: Reflexes normal.             Health Maintenance   Topic Date Due    Hepatitis C screen  Never done    Varicella vaccine (1 of 2 - 2-dose childhood series) Never done    TSH testing  09/03/2021    Cervical cancer screen  02/05/2022    DTaP/Tdap/Td vaccine (2 - Td) 03/04/2025    Flu vaccine  Completed    COVID-19 Vaccine  Completed    HIV screen  Completed    Hepatitis A vaccine  Aged Out    Hepatitis B vaccine  Aged Out    Hib vaccine  Aged Out    Meningococcal (ACWY) vaccine  Aged Out    Pneumococcal 0-64 years Vaccine  Aged Out          Assessment/Plan:     Diagnosis Orders   1. SABA (dyspnea on exertion)  CT CHEST PULMONARY EMBOLISM W CONTRAST   2. Elevated D-dimer    Long discussion with patient about her ongoing issues.   She had several questions about elevated D-dimer which I attempted to answer. D-dimer is a nonspecific marker for VTE, was only mildly elevated. Lower extremity Dopplers noted to be negative for DVT. CTA chest performed today was personally reviewed by me-difficult to interpret due to body habitus and also timing of the bolus. Overall my suspicion for PE is low, but patient does describe significant respiratory symptoms and has poor mobility. I will request another CTA chest to be performed tomorrow, check serum creatinine. Given the low index of suspicion for PE-I would hold off on further anticoagulation, until the results of the study would be available. However, I suspect she has other alternative explanations for her respiratory symptoms-morbid obesity/OHS and ?  GERD. We will contact patient with the results of CTA chest.  No further follow-up has been organized, unless needed.

## 2021-04-24 ENCOUNTER — PATIENT MESSAGE (OUTPATIENT)
Dept: FAMILY MEDICINE CLINIC | Age: 31
End: 2021-04-24

## 2021-04-24 ENCOUNTER — HOSPITAL ENCOUNTER (OUTPATIENT)
Dept: CT IMAGING | Age: 31
Discharge: HOME | End: 2021-04-24
Payer: COMMERCIAL

## 2021-04-24 DIAGNOSIS — R06.09 DOE (DYSPNEA ON EXERTION): ICD-10-CM

## 2021-04-24 PROCEDURE — 71260 CT THORAX DX C+: CPT

## 2021-04-24 PROCEDURE — 6360000004 HC RX CONTRAST MEDICATION: Performed by: EMERGENCY MEDICINE

## 2021-04-24 RX ADMIN — IOPAMIDOL 75 ML: 755 INJECTION, SOLUTION INTRAVENOUS at 08:30

## 2021-04-26 ENCOUNTER — APPOINTMENT (OUTPATIENT)
Dept: CT IMAGING | Age: 31
End: 2021-04-26
Payer: COMMERCIAL

## 2021-04-26 ENCOUNTER — HOSPITAL ENCOUNTER (EMERGENCY)
Age: 31
Discharge: HOME | End: 2021-04-26
Payer: COMMERCIAL

## 2021-04-26 ENCOUNTER — APPOINTMENT (OUTPATIENT)
Dept: GENERAL RADIOLOGY | Age: 31
End: 2021-04-26
Payer: COMMERCIAL

## 2021-04-26 VITALS
WEIGHT: 293 LBS | BODY MASS INDEX: 54.67 KG/M2 | HEART RATE: 75 BPM | RESPIRATION RATE: 16 BRPM | TEMPERATURE: 97.9 F | SYSTOLIC BLOOD PRESSURE: 151 MMHG | DIASTOLIC BLOOD PRESSURE: 93 MMHG | OXYGEN SATURATION: 98 %

## 2021-04-26 DIAGNOSIS — R03.0 ELEVATED BLOOD PRESSURE READING: ICD-10-CM

## 2021-04-26 DIAGNOSIS — R53.83 MALAISE AND FATIGUE: ICD-10-CM

## 2021-04-26 DIAGNOSIS — R53.81 MALAISE AND FATIGUE: ICD-10-CM

## 2021-04-26 DIAGNOSIS — R07.9 CHEST PAIN, UNSPECIFIED TYPE: Primary | ICD-10-CM

## 2021-04-26 LAB
A/G RATIO: 1.3 (ref 1.1–2.2)
ALBUMIN SERPL-MCNC: 4.3 G/DL (ref 3.4–5)
ALP BLD-CCNC: 81 U/L (ref 40–129)
ALT SERPL-CCNC: 18 U/L (ref 10–40)
ANION GAP SERPL CALCULATED.3IONS-SCNC: 10 MMOL/L (ref 3–16)
AST SERPL-CCNC: 18 U/L (ref 15–37)
BASOPHILS ABSOLUTE: 0 K/UL (ref 0–0.2)
BASOPHILS RELATIVE PERCENT: 0.6 %
BILIRUB SERPL-MCNC: 0.4 MG/DL (ref 0–1)
BILIRUBIN URINE: NEGATIVE
BLOOD, URINE: NEGATIVE
BUN BLDV-MCNC: 8 MG/DL (ref 7–20)
CALCIUM SERPL-MCNC: 9.5 MG/DL (ref 8.3–10.6)
CHLORIDE BLD-SCNC: 103 MMOL/L (ref 99–110)
CLARITY: CLEAR
CO2: 27 MMOL/L (ref 21–32)
COLOR: YELLOW
CREAT SERPL-MCNC: 0.9 MG/DL (ref 0.6–1.1)
EOSINOPHILS ABSOLUTE: 0.1 K/UL (ref 0–0.6)
EOSINOPHILS RELATIVE PERCENT: 1.3 %
GFR AFRICAN AMERICAN: >60
GFR NON-AFRICAN AMERICAN: >60
GLOBULIN: 3.2 G/DL
GLUCOSE BLD-MCNC: 88 MG/DL (ref 70–99)
GLUCOSE URINE: NEGATIVE MG/DL
HCT VFR BLD CALC: 39.7 % (ref 36–48)
HEMOGLOBIN: 13.3 G/DL (ref 12–16)
INR BLD: 1.07 (ref 0.86–1.14)
KETONES, URINE: NEGATIVE MG/DL
LEUKOCYTE ESTERASE, URINE: NEGATIVE
LYMPHOCYTES ABSOLUTE: 1.2 K/UL (ref 1–5.1)
LYMPHOCYTES RELATIVE PERCENT: 24.2 %
MCH RBC QN AUTO: 29.6 PG (ref 26–34)
MCHC RBC AUTO-ENTMCNC: 33.5 G/DL (ref 31–36)
MCV RBC AUTO: 88.3 FL (ref 80–100)
MICROSCOPIC EXAMINATION: NORMAL
MONOCYTES ABSOLUTE: 0.3 K/UL (ref 0–1.3)
MONOCYTES RELATIVE PERCENT: 6.4 %
NEUTROPHILS ABSOLUTE: 3.4 K/UL (ref 1.7–7.7)
NEUTROPHILS RELATIVE PERCENT: 67.5 %
NITRITE, URINE: NEGATIVE
PDW BLD-RTO: 13.2 % (ref 12.4–15.4)
PH UA: 7 (ref 5–8)
PLATELET # BLD: 240 K/UL (ref 135–450)
PMV BLD AUTO: 7.6 FL (ref 5–10.5)
POTASSIUM REFLEX MAGNESIUM: 4.1 MMOL/L (ref 3.5–5.1)
PRO-BNP: 67 PG/ML (ref 0–124)
PROTEIN UA: NEGATIVE MG/DL
PROTHROMBIN TIME: 12.4 SEC (ref 10–13.2)
RBC # BLD: 4.5 M/UL (ref 4–5.2)
SODIUM BLD-SCNC: 140 MMOL/L (ref 136–145)
SPECIFIC GRAVITY UA: 1.01 (ref 1–1.03)
TOTAL PROTEIN: 7.5 G/DL (ref 6.4–8.2)
TROPONIN: <0.01 NG/ML
TROPONIN: <0.01 NG/ML
URINE REFLEX TO CULTURE: NORMAL
URINE TYPE: NORMAL
UROBILINOGEN, URINE: 0.2 E.U./DL
WBC # BLD: 5.1 K/UL (ref 4–11)

## 2021-04-26 PROCEDURE — 70450 CT HEAD/BRAIN W/O DYE: CPT

## 2021-04-26 PROCEDURE — 81003 URINALYSIS AUTO W/O SCOPE: CPT

## 2021-04-26 PROCEDURE — 85610 PROTHROMBIN TIME: CPT

## 2021-04-26 PROCEDURE — 99283 EMERGENCY DEPT VISIT LOW MDM: CPT

## 2021-04-26 PROCEDURE — 71045 X-RAY EXAM CHEST 1 VIEW: CPT

## 2021-04-26 PROCEDURE — 85025 COMPLETE CBC W/AUTO DIFF WBC: CPT

## 2021-04-26 PROCEDURE — 83880 ASSAY OF NATRIURETIC PEPTIDE: CPT

## 2021-04-26 PROCEDURE — 84484 ASSAY OF TROPONIN QUANT: CPT

## 2021-04-26 PROCEDURE — 6370000000 HC RX 637 (ALT 250 FOR IP): Performed by: PHYSICIAN ASSISTANT

## 2021-04-26 PROCEDURE — 93005 ELECTROCARDIOGRAM TRACING: CPT | Performed by: PHYSICIAN ASSISTANT

## 2021-04-26 PROCEDURE — 80053 COMPREHEN METABOLIC PANEL: CPT

## 2021-04-26 RX ORDER — ACETAMINOPHEN 325 MG/1
650 TABLET ORAL ONCE
Status: COMPLETED | OUTPATIENT
Start: 2021-04-26 | End: 2021-04-26

## 2021-04-26 RX ORDER — HYDROXYZINE HYDROCHLORIDE 10 MG/1
10 TABLET, FILM COATED ORAL 3 TIMES DAILY PRN
COMMUNITY

## 2021-04-26 RX ADMIN — ACETAMINOPHEN 650 MG: 325 TABLET ORAL at 08:45

## 2021-04-26 ASSESSMENT — HEART SCORE: ECG: 1

## 2021-04-26 ASSESSMENT — ENCOUNTER SYMPTOMS
SHORTNESS OF BREATH: 0
DIARRHEA: 0
ABDOMINAL PAIN: 0
CHEST TIGHTNESS: 0
VOMITING: 0
NAUSEA: 0

## 2021-04-26 ASSESSMENT — PAIN SCALES - GENERAL: PAINLEVEL_OUTOF10: 6

## 2021-04-26 NOTE — TELEPHONE ENCOUNTER
From: Mc Rodriguez  To: Jen Mercado MD  Sent: 4/24/2021 12:33 PM EDT  Subject: Test Results Question    I really need the test results explained to me from today's ct scan.

## 2021-04-26 NOTE — ED PROVIDER NOTES
[facility]        Pt Name: Kar Chavez  MRN: [id number]  [place] 1990  Date of evaluation: 4/26/2021  Provider: Hannah Roberts PA-C  PCP: Tej Gavin MD    URI. I have evaluated this patient. My supervising physician was available for consultation. CHIEF COMPLAINT       Chief Complaint   Patient presents with    Fatigue     pt in from home c/o tiredness, weakness, chest pain, HTN. Recent visit showed elevated d-dimer, has had 2 CT and a doppler looking for a blood clot. pt states the only place they haven't looked for a clot is her head. HISTORY OF PRESENT ILLNESS   (Location, Timing/Onset, Context/Setting, Quality, Duration, Modifying Factors, Severity, Associated Signs and Symptoms)  Note limiting factors. Kar Chavez is a 28 y.o. female presents the emergency department with a couple of different complaints. The primary reason that she is here today is because she is having difficulties with elevated blood pressure reading and generalized headache pain. Patient states that she has been battling difficulties of intermittent symptoms related to headache pain for a couple of months. She was seen and evaluated by her doctor. She was diagnosed with migraines and was reportedly brought a medication to help with this. Unfortunately in the recent 4 to 6 weeks she is had difficulties with elevated blood pressure readings which she thinks makes her headaches worse. She states yesterday she had systolic readings in the 570A. She states she has associated symptoms of fatigue and malaise. She states that she has not had any medications or done anything for generalized headache pain today. She reports her current level of pain and discomfort in her head to be 4 out of 10. She denies visual disturbances or gait abnormalities and reports she was able to drive herself here to the emergency department today. She continues to have difficulties as a pertains to intermittent chest pain which is now ongoing into its second week. She was seen and evaluated for leg pain and elevated D-dimer and has had CT PE studies within the recent week to rule out pulmonary embolism as a cause of her chest pain. She states despite having ongoing difficulties she has not had any cardiac evaluation performed. She states even though she has had elevated blood pressure readings she had not been placed on any kind of antihypertensive medication by her primary care physician because the provider thought that her symptoms were more along the lines of anxiety and stress then essential hypertension. Patient states that she does have her regular and usual discomfort in the middle portion of her chest today. She states she is found nothing to make the symptoms better and or worsen with this she presents the ED for evaluation and treatment. Nursing Notes were all reviewed and agreed with or any disagreements were addressed in the HPI. REVIEW OF SYSTEMS    (2-9 systems for level 4, 10 or more for level 5)     Review of Systems   Constitutional: Positive for fatigue. Negative for activity change, chills and fever. Eyes: Negative for visual disturbance. Respiratory: Negative for chest tightness and shortness of breath. Cardiovascular: Positive for chest pain and leg swelling. Gastrointestinal: Negative for abdominal pain, diarrhea, nausea and vomiting. Genitourinary: Negative for dysuria and flank pain. Skin: Negative for rash and wound. Neurological: Positive for weakness and headaches. Positives and Pertinent negatives as per HPI. Except as noted above in the ROS, all other systems were reviewed and negative.        PAST MEDICAL HISTORY     Past Medical History:   Diagnosis Date    Anemia     Anxiety     Breast cyst     pt. states she has condition that she gets lumps in breasts    Depression     Epilepsy ([facility] [address] last gran mal age 4; focalized seizure of a limb 2/2021    Fibrocystic breast     Legally blind     left eye only    Ovarian tumor     benign    Pseudotumor cerebri     cerebri optic neuritis    Thyroid disease     hypo         SURGICAL HISTORY     Past Surgical History:   Procedure Laterality Date    BREAST LUMPECTOMY      BREAST SURGERY      lump removal    ESOPHAGEAL DILATATION N/A 4/2/2021    ESOPHAGEAL DILATION [place] performed by Madonna Lloyd MD at [email or web address]  08/18/2017    with D&C   [facility] SURGERY Right 2011    right ovary and tube removed for benign tumor    UPPER GASTROINTESTINAL ENDOSCOPY N/A 4/2/2021    EGD BIOPSY performed by Madonna Lloyd MD at [address] [facility]       Current Discharge Medication List      CONTINUE these medications which have NOT CHANGED    Details   hydrOXYzine (ATARAX) 10 MG tablet Take 10 mg by mouth 3 times daily as needed for Itching      sulfamethoxazole-trimethoprim (BACTRIM DS;SEPTRA DS) 800-160 MG per tablet Take 1 tablet by mouth 2 times daily      Brivaracetam (BRIVIACT) 50 MG TABS Take by mouth nightly.       pantoprazole (PROTONIX) 40 MG tablet Take 1 tablet by mouth every morning (before breakfast)  Qty: 30 tablet, Refills: 2      famotidine (PEPCID) 20 MG tablet Take 1 tablet by mouth nightly  Qty: 120 tablet, Refills: 2      ondansetron (ZOFRAN) 4 MG tablet Take 1 tablet by mouth daily as needed for Nausea or Vomiting  Qty: 30 tablet, Refills: 0    Associated Diagnoses: Anxiety      vitamin D (ERGOCALCIFEROL) 1.25 MG (35461 UT) CAPS capsule Take 1 capsule by mouth once a week  Qty: 4 capsule, Refills: 5      sucralfate (CARAFATE) 1 GM tablet TAKE ONE TABLET BY MOUTH THREE TIMES A DAY  Qty: 60 tablet, Refills: 0      Nutritional Supplements (EQUATE PO) Take by mouth daily      butalbital-acetaminophen-caffeine (FIORICET, ESGIC) -40 MG per tablet Take 1 tablet by mouth every 6 hours as needed for Migraine  Qty: 10 tablet, Refills: 3    Associated Diagnoses: Intractable chronic cluster headache      butalbital-acetaminophen-caffeine (FIORICET, ESGIC) -40 MG per tablet Take 1 tablet by mouth every 6 hours as needed for Migraine  Qty: 10 tablet, Refills: 3    Associated Diagnoses: Intractable chronic cluster headache               ALLERGIES     Food    FAMILYHISTORY       Family History   Problem Relation Age of Onset    Arthritis Mother     Cancer Mother     High Blood Pressure Mother     Cancer Father     Depression Father     Diabetes Father     Kidney Disease Father     Mental Illness Father     Vision Loss Sister         Sister is legally blind in her L eye    Learning Disabilities Sister     Mental Illness Sister     Birth Defects Brother     Heart Disease Brother     Heart Disease Paternal Uncle     Stroke Paternal Uncle     Cancer Maternal Grandmother     Cancer Maternal Grandfather     Cancer Paternal Grandmother     Heart Disease Paternal Grandmother     High Cholesterol Paternal Grandmother     Stroke Paternal Grandmother     Cancer Paternal Grandfather     Stroke Paternal Grandfather     Miscarriages / Stillbirths Paternal Cousin           SOCIAL HISTORY       Social History     Tobacco Use    Smoking status: Former Smoker     Packs/day: 1.00     Years: 9.00     Pack years: 9.00     Types: Cigarettes     Start date: 2017     Quit date: 2020     Years since quittin.4    Smokeless tobacco: Never Used   Substance Use Topics    Alcohol use: No    Drug use: No       SCREENINGS      Heart Score for chest pain patients  History: Slightly Suspicious  ECG: Non-Specifc repolarization disturbance/LBTB/PM  Patient Age: < 45 years  *Risk factors for Atherosclerotic disease: Obesity  Risk Factors: 1 or 2 risk factors  Troponin: < 1X normal limit  Heart Score Total: 2      PHYSICAL EXAM    (up to 7 for level 4, 8 or more for level 5)     ED Triage Behavior: Behavior normal. Behavior is cooperative. DIAGNOSTIC RESULTS   LABS:    Labs Reviewed   CBC WITH AUTO DIFFERENTIAL    Narrative:     Performed at:  [facility]  [address], [address]   Phone [phone]   URINE RT REFLEX TO CULTURE    Narrative:     Performed at:  [facility]  [address], [address]   Phone [phone]   TROPONIN    Narrative:     Performed at:  [facility] [address], [address]   Phone [phone]   BRAIN NATRIURETIC PEPTIDE    Narrative:     Performed at:  [facility] [address], [address]   Phone [phone]   PROTIME-INR    Narrative:     Performed at:  [facility] [address], [address]   Phone [phone]   COMPREHENSIVE METABOLIC PANEL W/ REFLEX TO MG FOR LOW K    Narrative:     Performed at:  [facility]  [address], [address]   Phone [phone]   TROPONIN    Narrative:     Performed at:  [facility]  [address], [address]   Phone [phone]       All other labs were within normal range or not returned as of this dictation. EKG: All EKG's are interpreted by the Emergency Department Physician in the absence of a cardiologist.  Please see their note for interpretation of EKG.       RADIOLOGY:   Non-plain film images such as CT, Ultrasound and MRI are read by the radiologist. Plain radiographic images are visualized and preliminarily interpreted by the ED Provider with the below findings:        Interpretation per the Radiologist below, if available at the time of this note:    XR CHEST PORTABLE   Final Result   No active cardiopulmonary disease         CT Head WO Contrast   Preliminary Result   No evidence of acute intracranial abnormality. Ct Chest Pulmonary Embolism W Contrast    Result Date: 4/24/2021  EXAMINATION: CTA OF THE CHEST 4/24/2021 8:30 am TECHNIQUE: CTA of the chest was performed after the administration of intravenous contrast.  Multiplanar reformatted images are provided for review. MIP images are provided for review. Dose modulation, iterative reconstruction, and/or weight based adjustment of the mA/kV was utilized to reduce the radiation dose to as low as reasonably achievable. COMPARISON: 04/23/2021. HISTORY: ORDERING SYSTEM PROVIDED HISTORY: SABA (dyspnea on exertion) TECHNOLOGIST PROVIDED HISTORY: Reason for exam:->TO ASSESS FOR PE, RECENT CTA chest was suboptimal. Pt has dyspnea and chest pain with mild elevation for DDimer. Reason for Exam: : SABA (dyspnea on exertion), TO ASSESS FOR PE, RECENT CTA chest was suboptimal. Pt has dyspnea and chest pain with mild elevation for DDimer. Acuity: Acute Type of Exam: Subsequent/Follow-up FINDINGS: Pulmonary Arteries: Proximal pulmonary arteries are adequately opacified for evaluation. No evidence of intraluminal filling defect to suggest pulmonary embolism. Main pulmonary artery is normal in caliber. There is again suboptimal opacification of subsegmental vessels precluding evaluation for intraluminal filling defect. Mediastinum: No evidence of mediastinal lymphadenopathy. The heart and pericardium demonstrate no acute abnormality. There is no acute abnormality of the thoracic aorta. Lungs/pleura: The lungs are without acute process. No focal consolidation or pulmonary edema. No evidence of pleural effusion or pneumothorax. Upper Abdomen: Limited images of the upper abdomen are unremarkable. Soft Tissues/Bones: No acute bone or soft tissue abnormality. No evidence of proximal pulmonary embolism or acute pulmonary abnormality.      Vl Extremity Venous Right    Result Date: 4/23/2021  Lower Extremities DVT Study  Demographics   Patient Name [address] [facility]   Date of Study     04/23/2021          Gender             Female   Patient Number    [id number]          Date of Birth      1990   Visit Number      [id number]           Age                28 year(s)   Accession Number  [id number]          Room Number   Corporate ID      [id number]            Sonographer        Fawn Barroso,                                                           [facility],        Interpreting       [facility]  Physician         Markos Nguyen, NP      Physician          Aaron Jeffries MD,                                                           [facility], [facility]  Procedure Type of Study:   Veins:Lower Extremities DVT Study, VL EXTREMITY VENOUS DUPLEX RIGHT. Vascular Sonographer Report  Additional Indications:Right leg pain Elevated D-dimer Impressions Right Impression No evidence of deep vein or superficial vein thrombosis involving the right lower extremity and the left common femoral vein. Conclusions   Summary   No evidence of deep vein or superficial thrombosis involving the right lower  extremity and the contralateral proximal common femoral vein. Signature   ------------------------------------------------------------------  Electronically signed by Aaron Jeffries MD, [facility], [address]  (Interpreting physician) on 04/23/2021 at 11:30 AM  ------------------------------------------------------------------  Patient Status:STAT. [address] [place] [address] Vascular Lab. Technical Quality:Adequate visualization. Velocities are measured in cm/s ; Diameters are measured in mm Right Lower Extremities DVT Study Measurements Right 2D Measurements +------------------------+----------+---------------+----------+ ! Location                ! Visualized! Compressibility! Thrombosis! +------------------------+----------+---------------+----------+ ! Sapheno Femoral Junction! Yes       ! Yes            ! None      ! +------------------------+----------+---------------+----------+ ! GSV Thigh               ! Yes       ! Yes            ! None      ! +------------------------+----------+---------------+----------+ ! Common Femoral          !Yes       ! Yes            ! None      ! +------------------------+----------+---------------+----------+ ! Prox Femoral            !Yes       ! Yes            ! None      ! +------------------------+----------+---------------+----------+ ! Mid Femoral             !Yes       ! Yes            ! None      ! +------------------------+----------+---------------+----------+ ! Dist Femoral            !Yes       ! Yes            ! None      ! +------------------------+----------+---------------+----------+ ! Deep Femoral            !Yes       ! Yes            ! None      ! +------------------------+----------+---------------+----------+ ! Popliteal               !Yes       ! Yes            ! None      ! +------------------------+----------+---------------+----------+ ! GSV Below Knee          ! Yes       ! Yes            ! None      ! +------------------------+----------+---------------+----------+ ! Gastroc                 ! Yes       ! Yes            ! None      ! +------------------------+----------+---------------+----------+ ! Soleal                  !Yes       ! Yes            ! None      ! +------------------------+----------+---------------+----------+ ! PTV                     ! Yes       ! Yes            ! None      ! +------------------------+----------+---------------+----------+ ! Peroneal                !Yes       ! Yes            ! None      ! +------------------------+----------+---------------+----------+ ! GSV Calf                ! Yes       ! Yes            ! None      ! +------------------------+----------+---------------+----------+ ! SSV                     ! Yes       ! Yes            ! None      ! +------------------------+----------+---------------+----------+ Right Doppler Measurements +------------------------+------+------+------------+ ! Location                ! Signal!Reflux! Reflux (sec)! +------------------------+------+------+------------+ ! Sapheno Femoral Junction! Phasic!      !            ! +------------------------+------+------+------------+ ! Common Femoral          !Phasic!      !            ! +------------------------+------+------+------------+ ! Prox Femoral            !Phasic!      !            ! +------------------------+------+------+------------+ ! Deep Femoral            !Phasic!      !            ! +------------------------+------+------+------------+ ! Popliteal               !Phasic!      !            ! +------------------------+------+------+------------+ Left Lower Extremities DVT Study Measurements Left 2D Measurements +--------------+----------+---------------+----------+ ! Location      ! Visualized! Compressibility! Thrombosis! +--------------+----------+---------------+----------+ ! Common Femoral!Yes       ! Yes            ! None      ! +--------------+----------+---------------+----------+ Left Doppler Measurements +--------------+------+------+------------+ ! Location      ! Signal!Reflux! Reflux (sec)! +--------------+------+------+------------+ ! Common Femoral!Phasic!      !            ! +--------------+------+------+------------+    Cta Chest W Contrast    Result Date: 4/23/2021  CT angiography chest with IV HISTORY: Shortness of breath, elevated d-dimer, evaluate for pulmonary embolus Scans from thoracic inlet to upper abdomen during infusion of 80 mL Isovue-370. Up-to-date CT equipment with radiation dose reduction techniques No prior studies for review Pulmonary arterial trunk and main pulmonary arteries are fairly well opacified and are patent. Due to image graininess (secondary to patient's large chest girth) and less than optimal opacification of pulmonary arterial branches, peripheral embolus cannot be excluded. Cardiac size normal. No intracardiac mass or thrombus. Thoracic aorta normal size without intimal flap. No intrathoracic or axillary lymphadenopathy. Trachea and mainstem bronchi patent. Lungs clear. No consolidation or pleural effusion. Thyroid gland without discrete nodule. Visualized liver and spleen unremarkable. No calcified gallstones. Pancreas and adrenals unremarkable. No suspicious osseous lesion     No central pulmonary embolus. Distal embolus cannot be excluded due to image graininess and also due to less than optimal opacification of the pulmonary arterial branches          PROCEDURES   Unless otherwise noted below, none     Procedures    CRITICAL CARE TIME   N/A    CONSULTS:  None      EMERGENCY DEPARTMENT COURSE and DIFFERENTIAL DIAGNOSIS/MDM:   Vitals:    Vitals:    04/26/21 0737 04/26/21 0938 04/26/21 1130   BP: (!) 165/102 (!) 157/63 (!) 148/83   Pulse: 88 79 73   Resp: 18 16 16   Temp: 97.9 °F (36.6 °C)     TempSrc: Oral     SpO2: 98% 98% 100%   Weight: (!) 349 lb 1 oz (158.3 kg)         Patient was given the following medications:  Medications   acetaminophen (TYLENOL) tablet 650 mg (650 mg Oral Given 4/26/21 0845)           The patient's detailed history of present illness is documented as above. Upon arrival to the emergency department the patient's vital signs are as documented. The patient is noted to be hemodynamically stable and afebrile. Physical examination findings are as above. EKG performed upon arrival demonstrates a sinus rhythm with a rate of 85. Normal axis and interval.  No evidence of acute ST elevation. Comparison EKG from August 18, 2017 has been reviewed and there is been no significant interval change. CBC demonstrates no evidence of leukocytosis anemia and/or thrombocytopenia. Comprehensive metabolic panel is normal and finding. Troponin is less than 0.01 proBNP is 67 INR is 1.07.  UA demonstrates no evidence of infection.   Chest x-ray demonstrates no evidence of acute cardiopulmonary process well CT imaging the head which is ordered because of the patient symptomatology as well as elevated blood pressure readings demonstrates no evidence of acute intercranial normality. Lengthy discussion was had with the patient in regards to the above-mentioned. Her blood pressure has trended down nicely without intervention here in the emergency department but still has a systolic blood pressure of 157. Because of difficulties as a pertains to chest pain I did order a delta troponin which is also noted to be normal this in addition to her heart score is such that I believe she can be managed on outpatient basis. I suggested that she follow with her primary care physician on an outpatient basis. Here in the emergency department setting I do not believe that there is anything that would require ongoing care management on an inpatient basis. The patient has been made aware of the signs and symptoms which would necessitate an immediate return to the emergency department and verbalizes an understanding of these signs and symptoms. I estimate there is low risk for acute coronary syndrome, malignant dysrhythmia, hypertensive crisis, pulmonary embolism, severe sepsis, or vascular dissection and therefor, I consider the discharge disposition reasonable. The patient and/or family and I have discussed the diagnosis and risks, and we agree with discharging home to follow-up with their primary doctor. We also discussed returning to the Emergency Department immediately if new or worsening symptoms occur. We have discussed the symptoms which are most concerning (e.g., bloody sputum, fever, worsening pain or shortness of breath, vomiting, weakness) that necessitate immediate return. FINAL IMPRESSION      1. Chest pain, unspecified type    2. Malaise and fatigue    3.  Elevated blood pressure reading          DISPOSITION/PLAN   DISPOSITION: Discharged to home      PATIENT REFERREDTO:  Lucho Whiting MD  [address] [facility] [address] [place] [address]  [phone]    Schedule an appointment as soon as possible for a visit       [facility] Emergency Department  [address] [facility]  [phone]    If symptoms worsen      DISCHARGE MEDICATIONS:  Current Discharge Medication List          DISCONTINUED MEDICATIONS:  Current Discharge Medication List      STOP taking these medications       Cephalexin (KEFLEX PO) Comments:   Reason for Stopping:                      (Please note that portions of this note were completed with a voice recognition program.  Efforts were made to edit the dictations but occasionally words are mis-transcribed.)    Rosa Lockett PA-C (electronically signed)           Milan Peng PA-C  04/26/21 1217

## 2021-04-26 NOTE — ED NOTES
Bed: 08  Expected date:   Expected time:   Means of arrival:   Comments:  Vianey Verdin RN  04/26/21 5904

## 2021-04-26 NOTE — LETTER
[facility] Emergency Department  [address] [address]  Phone: [phone]               April 26, 2021    Patient: Rico Travis   YOB: 1990   Date of Visit: 4/26/2021       To Whom It May Concern:    Eliseo Becker was seen and treated in our emergency department on 4/26/2021. She may return to work on 4/27/21 without restriction. Sincerely, SYBIL DUBON               Signature:__________________________________

## 2021-04-27 DIAGNOSIS — I10 ESSENTIAL HYPERTENSION: Primary | ICD-10-CM

## 2021-04-27 LAB
EKG ATRIAL RATE: 85 BPM
EKG DIAGNOSIS: NORMAL
EKG P AXIS: 10 DEGREES
EKG P-R INTERVAL: 172 MS
EKG Q-T INTERVAL: 370 MS
EKG QRS DURATION: 96 MS
EKG QTC CALCULATION (BAZETT): 440 MS
EKG R AXIS: 38 DEGREES
EKG T AXIS: 20 DEGREES
EKG VENTRICULAR RATE: 85 BPM

## 2021-04-27 PROCEDURE — 93010 ELECTROCARDIOGRAM REPORT: CPT | Performed by: INTERNAL MEDICINE

## 2021-04-27 RX ORDER — LISINOPRIL 40 MG/1
40 TABLET ORAL DAILY
Qty: 90 TABLET | Refills: 1 | Status: SHIPPED | OUTPATIENT
Start: 2021-04-27 | End: 2021-05-17

## 2021-04-29 ENCOUNTER — OFFICE VISIT (OUTPATIENT)
Dept: FAMILY MEDICINE CLINIC | Age: 31
End: 2021-04-29
Payer: COMMERCIAL

## 2021-04-29 ENCOUNTER — HOSPITAL ENCOUNTER (OUTPATIENT)
Age: 31
Setting detail: SPECIMEN
Discharge: HOME | End: 2021-04-29
Payer: COMMERCIAL

## 2021-04-29 VITALS
OXYGEN SATURATION: 99 % | HEART RATE: 95 BPM | BODY MASS INDEX: 45.99 KG/M2 | WEIGHT: 293 LBS | DIASTOLIC BLOOD PRESSURE: 84 MMHG | HEIGHT: 67 IN | SYSTOLIC BLOOD PRESSURE: 136 MMHG

## 2021-04-29 DIAGNOSIS — M79.604 BILATERAL LEG PAIN: Primary | ICD-10-CM

## 2021-04-29 DIAGNOSIS — M79.605 BILATERAL LEG PAIN: Primary | ICD-10-CM

## 2021-04-29 DIAGNOSIS — R79.89 ELEVATED D-DIMER: ICD-10-CM

## 2021-04-29 LAB — D DIMER: 305 NG/ML DDU (ref 0–229)

## 2021-04-29 PROCEDURE — 99214 OFFICE O/P EST MOD 30 MIN: CPT | Performed by: FAMILY MEDICINE

## 2021-04-29 PROCEDURE — G8427 DOCREV CUR MEDS BY ELIG CLIN: HCPCS | Performed by: FAMILY MEDICINE

## 2021-04-29 PROCEDURE — 36415 COLL VENOUS BLD VENIPUNCTURE: CPT

## 2021-04-29 PROCEDURE — 85379 FIBRIN DEGRADATION QUANT: CPT

## 2021-04-29 PROCEDURE — 1036F TOBACCO NON-USER: CPT | Performed by: FAMILY MEDICINE

## 2021-04-29 PROCEDURE — G8417 CALC BMI ABV UP PARAM F/U: HCPCS | Performed by: FAMILY MEDICINE

## 2021-04-29 RX ORDER — SUCRALFATE 1 G/1
TABLET ORAL
Qty: 60 TABLET | Refills: 0 | Status: SHIPPED | OUTPATIENT
Start: 2021-04-29 | End: 2021-05-17

## 2021-04-29 NOTE — TELEPHONE ENCOUNTER
Please help her schedule something today or tomorrow or advise to go to ER or urgent care. If she comes now I can work her in if that works for her.

## 2021-04-29 NOTE — PROGRESS NOTES
Rachael Sanders is a 28 y.o. female. HPI:  Hx bad anxiety. Very worried about her health recently. Went to ER last week w R leg pain. Had elevated D dimer so was put on eliquis. Had US done of R leg the next day, was neg for DVT. States her pain is over the R lateral lower leg however but this was not evaluated with the US. Has had chest pain and dyspnea for a while, saw pulmonology. Had CT done to r/o PE which was neg. Very worried about elevated D dimer and possible blood clots. Her mom had a PE in the past.     Feeling very fatiged, went to ER and workup was unremarkable. Was told fatigue was d/t BP and anxiety. Now still having pain in R leg and also now in L leg that is intermittent. In L leg pain worse behind the knee. worried about DVT in L leg since this was not evaluated w US. Still having pain in R lateral lower leg with walking ankle dorsiflexion. ROS:  Gen:  Denies fever, chills, headaches. HEENT:  Denies cold symptoms, sore throat. CV:  Denies chest pain or tightness, palpitations. Pulm:  Denies shortness of breath, cough. Abd:  Denies abdominal pain, change in bowel habits. I have reviewed the patient's medical/surgical/family/social in detail and updated the computerized patient record as appropriate. Current Outpatient Medications   Medication Sig Dispense Refill    sucralfate (CARAFATE) 1 GM tablet TAKE ONE TABLET BY MOUTH THREE TIMES A DAY 60 tablet 0    lisinopril (PRINIVIL;ZESTRIL) 40 MG tablet Take 1 tablet by mouth daily 90 tablet 1    hydrOXYzine (ATARAX) 10 MG tablet Take 10 mg by mouth 3 times daily as needed for Itching      sulfamethoxazole-trimethoprim (BACTRIM DS;SEPTRA DS) 800-160 MG per tablet Take 1 tablet by mouth 2 times daily      Nutritional Supplements (EQUATE PO) Take by mouth daily      Brivaracetam (BRIVIACT) 50 MG TABS Take by mouth nightly.       pantoprazole (PROTONIX) 40 MG tablet Take 1 tablet by mouth every morning (before breakfast) 30 tablet 2    famotidine (PEPCID) 20 MG tablet Take 1 tablet by mouth nightly 120 tablet 2    ondansetron (ZOFRAN) 4 MG tablet Take 1 tablet by mouth daily as needed for Nausea or Vomiting 30 tablet 0    vitamin D (ERGOCALCIFEROL) 1.25 MG (63159 UT) CAPS capsule Take 1 capsule by mouth once a week (Patient taking differently: Take 50,000 Units by mouth every 14 days ) 4 capsule 5    butalbital-acetaminophen-caffeine (FIORICET, ESGIC) -40 MG per tablet Take 1 tablet by mouth every 6 hours as needed for Migraine 10 tablet 3    butalbital-acetaminophen-caffeine (FIORICET, ESGIC) -40 MG per tablet Take 1 tablet by mouth every 6 hours as needed for Migraine 10 tablet 3     No current facility-administered medications for this visit.         Past Medical History:   Diagnosis Date    Anemia     Anxiety     Breast cyst     pt. states she has condition that she gets lumps in breasts    Depression     Epilepsy ([facility] [address]     last gran mal age 4; focalized seizure of a limb 2/2021    Fibrocystic breast     Legally blind     left eye only    Ovarian tumor     benign    Pseudotumor cerebri     cerebri optic neuritis    Thyroid disease     hypo     Past Surgical History:   Procedure Laterality Date    BREAST LUMPECTOMY      BREAST SURGERY      lump removal    ESOPHAGEAL DILATATION N/A 4/2/2021    ESOPHAGEAL DILATION WALTON performed by Izabela Chris MD at [address] [facility]  08/18/2017    with D&C    LAPAROTOMY      OVARY SURGERY Right 2011    right ovary and tube removed for benign tumor    UPPER GASTROINTESTINAL ENDOSCOPY N/A 4/2/2021    EGD BIOPSY performed by Izabela Chris MD at [address] History   Problem Relation Age of Onset    Arthritis Mother     Cancer Mother     High Blood Pressure Mother     Cancer Father     Depression Father     Diabetes Father     Kidney Disease Father     Mental Illness Father     Vision Loss Sister         Sister is legally blind in her L eye    Learning Disabilities Sister     Mental Illness Sister     Birth Defects Brother     Heart Disease Brother     Heart Disease Paternal Uncle     Stroke Paternal Uncle     Cancer Maternal Grandmother     Cancer Maternal Grandfather     Cancer Paternal Grandmother     Heart Disease Paternal Grandmother     High Cholesterol Paternal Grandmother     Stroke Paternal Grandmother     Cancer Paternal Grandfather     Stroke Paternal Grandfather     [de-identified] / Stillbirths Paternal Cousin      Social History     Socioeconomic History    Marital status:      Spouse name: Not on file    Number of children: Not on file    Years of education: Not on file    Highest education level: Not on file   Occupational History    Not on file   Social Needs    Financial resource strain: Somewhat hard    Food insecurity     Worry: Never true     Inability: Never true    Transportation needs     Medical: No     Non-medical: No   Tobacco Use    Smoking status: Former Smoker     Packs/day: 1.00     Years: 9.00     Pack years: 9.00     Types: Cigarettes     Start date: 2017     Quit date: 2020     Years since quittin.4    Smokeless tobacco: Never Used   Substance and Sexual Activity    Alcohol use: No    Drug use: No    Sexual activity: Yes     Partners: Male   Lifestyle    Physical activity     Days per week: Not on file     Minutes per session: Not on file    Stress: Not on file   Relationships    Social connections     Talks on phone: Not on file     Gets together: Not on file     Attends [employer] service: Not on file     Active member of club or organization: Not on file     Attends meetings of clubs or organizations: Not on file     Relationship status: Not on file    Intimate partner violence     Fear of current or ex partner: Not on file     Emotionally abused: Not on file     Physically abused: Not on file     Forced sexual activity: Not on file   Other Topics Concern    Not on file   Social History Narrative    Not on file         OBJECTIVE:  /84 (Site: Right Upper Arm, Position: Sitting, Cuff Size: Large Adult)   Pulse 95   Ht 5' 7\" (1.702 m)   Wt (!) 344 lb (156 kg)   SpO2 99%   BMI 53.88 kg/m²   GEN:  WDWN, NAD, obese  HEENT:  NCAT,  PERRL, EOMI. CV: trace non pitting edema bilaterally   MSK: tenderness noted over L popliteal fossa and over R lateral calf. No erythema of lower legs noted. PSYCH: normal mood and affect. Intact judgement and insight  NEURO: A&O x 3    ASSESSMENT/PLAN:  1. Bilateral leg pain  Recheck D dimer today  If elevated again, will get bilateral venous US to r/o DVT   - D-Dimer, Quantitative; Future  - US DUP LOWER EXTREMITY RIGHT RAJ; Future  - US DUP LOWER EXTREMITY LEFT RAJ; Future    2. Elevated d-dimer  Plan as above  - D-Dimer, Quantitative; Future  - US DUP LOWER EXTREMITY RIGHT RAJ; Future  - US DUP LOWER EXTREMITY LEFT RAJ;  Future    ER records reviewed in detail w pt including labs, CT head, CT chest, CXR

## 2021-04-29 NOTE — TELEPHONE ENCOUNTER
Medication:   Requested Prescriptions     Pending Prescriptions Disp Refills    sucralfate (CARAFATE) 1 GM tablet [Pharmacy Med Name: SUCRALFATE 1 GM TABLET] 60 tablet 0     Sig: TAKE ONE TABLET BY MOUTH THREE TIMES A DAY        Last Filled:  4/12/2021 60 tabs 0 refills     Patient Phone Number: [phone] (home)     Last appt: 4/7/2021   Next appt: 5/4/2021    Last OARRS: No flowsheet data found.

## 2021-05-01 ENCOUNTER — HOSPITAL ENCOUNTER (OUTPATIENT)
Dept: VASCULAR LAB | Age: 31
Discharge: HOME | End: 2021-05-01
Payer: COMMERCIAL

## 2021-05-01 DIAGNOSIS — M79.605 BILATERAL LEG PAIN: ICD-10-CM

## 2021-05-01 DIAGNOSIS — M79.604 BILATERAL LEG PAIN: ICD-10-CM

## 2021-05-01 DIAGNOSIS — R79.89 ELEVATED D-DIMER: ICD-10-CM

## 2021-05-01 PROCEDURE — 93971 EXTREMITY STUDY: CPT

## 2021-05-17 ENCOUNTER — VIRTUAL VISIT (OUTPATIENT)
Dept: FAMILY MEDICINE CLINIC | Age: 31
End: 2021-05-17
Payer: COMMERCIAL

## 2021-05-17 DIAGNOSIS — F41.9 ANXIETY: Primary | ICD-10-CM

## 2021-05-17 DIAGNOSIS — G44.021 INTRACTABLE CHRONIC CLUSTER HEADACHE: ICD-10-CM

## 2021-05-17 DIAGNOSIS — N30.00 ACUTE CYSTITIS WITHOUT HEMATURIA: ICD-10-CM

## 2021-05-17 DIAGNOSIS — I10 ESSENTIAL HYPERTENSION: ICD-10-CM

## 2021-05-17 PROCEDURE — G8427 DOCREV CUR MEDS BY ELIG CLIN: HCPCS | Performed by: FAMILY MEDICINE

## 2021-05-17 PROCEDURE — 1036F TOBACCO NON-USER: CPT | Performed by: FAMILY MEDICINE

## 2021-05-17 PROCEDURE — 99214 OFFICE O/P EST MOD 30 MIN: CPT | Performed by: FAMILY MEDICINE

## 2021-05-17 PROCEDURE — G8417 CALC BMI ABV UP PARAM F/U: HCPCS | Performed by: FAMILY MEDICINE

## 2021-05-17 RX ORDER — SULFAMETHOXAZOLE AND TRIMETHOPRIM 800; 160 MG/1; MG/1
1 TABLET ORAL 2 TIMES DAILY
Qty: 6 TABLET | Refills: 0 | Status: SHIPPED | OUTPATIENT
Start: 2021-05-17 | End: 2021-05-20

## 2021-05-17 RX ORDER — LISINOPRIL 10 MG/1
10 TABLET ORAL DAILY
Qty: 30 TABLET | Refills: 1 | Status: SHIPPED | OUTPATIENT
Start: 2021-05-17 | End: 2021-06-10

## 2021-05-17 RX ORDER — ESCITALOPRAM OXALATE 10 MG/1
10 TABLET ORAL DAILY
Qty: 30 TABLET | Refills: 3 | COMMUNITY
Start: 2021-05-17 | End: 2021-06-10 | Stop reason: SDUPTHER

## 2021-05-17 RX ORDER — BUTALBITAL, ACETAMINOPHEN AND CAFFEINE 50; 325; 40 MG/1; MG/1; MG/1
1 TABLET ORAL EVERY 6 HOURS PRN
Qty: 10 TABLET | Refills: 3 | COMMUNITY
Start: 2021-05-17

## 2021-05-17 NOTE — PROGRESS NOTES
2021    TELEHEALTH EVALUATION -- Audio/Visual (During NPCMQ-35 public health emergency)    HPI:    Jess Guerrero (:  1990) has requested an audio/video evaluation for the following concern(s): She is here for the follow-up of blood pressure. She could not tolerate the lisinopril 40 mg daily. It made her very tired and sleepy  But her blood pressure still is in 150/90  Denies any chest pain or shortness of breath but has noticed swelling in her legs and hands worsens when she sits for long time. She has started her Lexapro 10 mg daily and that has helped her anxiety  Since then her blood pressure has come down from 574- 294 systolic to 663. Her migraines have improved since she started on Lexapro  She has only needed Fioricet once    She has been having symptoms of UTI  She had hemorrhoids. Which resolved and now she has been having symptoms of UTI  She has been having high-fiber diet    For GERD she has been taking Protonix      Review of Systems:  Gen:  Denies fever, chills, headaches. No weight loss  HEENT:  Denies cold symptoms, sore throat. CV:  Denies chest pain or tightness, palpitations. Pulm:  Denies shortness of breath, cough. Abd: As mentioned above    Prior to Visit Medications    Medication Sig Taking?  Authorizing Provider   butalbital-acetaminophen-caffeine (FIORICET, ESGIC) -40 MG per tablet Take 1 tablet by mouth every 6 hours as needed for Migraine Yes Siva Jacobsen MD   escitalopram (LEXAPRO) 10 MG tablet Take 1 tablet by mouth daily Yes Siva Jacobsen MD   lisinopril (PRINIVIL;ZESTRIL) 10 MG tablet Take 1 tablet by mouth daily Yes Siva Jacobsen MD   sulfamethoxazole-trimethoprim (BACTRIM DS) 800-160 MG per tablet Take 1 tablet by mouth 2 times daily for 3 days Yes Siva Jacobsen MD   hydrOXYzine (ATARAX) 10 MG tablet Take 10 mg by mouth 3 times daily as needed for Itching Yes Historical Provider, MD   Nutritional Supplements (EQUATE PO) Take by mouth daily Yes Historical Provider, MD   Brivaracetam (BRIVIACT) 50 MG TABS Take by mouth nightly.  Yes Historical Provider, MD   pantoprazole (PROTONIX) 40 MG tablet Take 1 tablet by mouth every morning (before breakfast) Yes Yohana Hedrick MD   vitamin D (ERGOCALCIFEROL) 1.25 MG (51086 UT) CAPS capsule Take 1 capsule by mouth once a week  Patient taking differently: Take 50,000 Units by mouth every 14 days  Yes Romario Holland MD       Past Medical History:   Diagnosis Date    Anemia     Anxiety     Breast cyst     pt. states she has condition that she gets lumps in breasts    Depression     Epilepsy [facility])     last gran mal age 4; focalized seizure of a limb 2/2021    Fibrocystic breast     Legally blind     left eye only    Ovarian tumor     benign    Pseudotumor cerebri     cerebri optic neuritis    Thyroid disease     hypo       Past Surgical History:   Procedure Laterality Date    BREAST LUMPECTOMY      BREAST SURGERY      lump removal    ESOPHAGEAL DILATATION N/A 4/2/2021    ESOPHAGEAL DILATION WALTON performed by Yohana Hedrick MD at "[facility]  08/18/2017    with D&C    LAPAROTOMY      OVARY SURGERY Right 2011    right ovary and tube removed for benign tumor    UPPER GASTROINTESTINAL ENDOSCOPY N/A 4/2/2021    EGD BIOPSY performed by Yohana Hedrick MD at [address] History   Problem Relation Age of Onset    Arthritis Mother     Cancer Mother     High Blood Pressure Mother     Other Mother         PE    Cancer Father     Depression Father     Diabetes Father     Kidney Disease Father     Mental Illness Father     Vision Loss Sister         Sister is legally blind in her L eye    Learning Disabilities Sister     Mental Illness Sister     Birth Defects Brother     Heart Disease Brother     Heart Disease Paternal Uncle     Stroke Paternal Uncle     Cancer Maternal Grandmother     Cancer Maternal Grandfather     Cancer Paternal Grandmother     Heart Disease Paternal Grandmother     High Cholesterol Paternal Grandmother     Stroke Paternal Grandmother     Cancer Paternal Grandfather     Stroke Paternal Grandfather     Miscarriages / Stillbirths Paternal Cousin        Allergies   Allergen Reactions    Food Rash     Mushrooms       Social History     Tobacco Use    Smoking status: Former Smoker     Packs/day: 1.00     Years: 9.00     Pack years: 9.00     Types: Cigarettes     Start date: 2017     Quit date: 2020     Years since quittin.4    Smokeless tobacco: Never Used   Vaping Use    Vaping Use: Never used   Substance Use Topics    Alcohol use: No    Drug use: No          PHYSICAL EXAMINATION:  Vital Signs: (As obtained by patient/caregiver or practitioner observation)  There were no vitals taken for this visit. Patient-Reported Vitals 2021   Patient-Reported Weight -   Patient-Reported Height -   Patient-Reported Systolic 598   Patient-Reported Diastolic 553   Patient-Reported Temperature 97.5   Patient-Reported SpO2 98        Respiratory rate appears normal      Constitutional: Appears well-developed and well-nourished. No apparent distress    Mental status: Alert and awake. Oriented to person/place/time. Able to follow commands    Eyes: EOM normal. Sclera normal. No discharge visible  HENT: Normocephalic, atraumatic. Mouth/Throat: Mucous membranes are moist. External Ears Normal    Neck: No visualized mass   Pulmonary/Chest: Respiratory effort normal.  No visualized signs of difficulty breathing or respiratory distress        Musculoskeletal:  Normal range of motion of neck  Neurological:       No Facial Asymmetry (Cranial nerve 7 motor function) (limited exam to video visit). No gaze palsy       Skin:  No significant exanthematous lesions or discoloration noted on facial skin       Psychiatric: Normal Affect. No Hallucinations            ASSESSMENT/PLAN:  1.  Intractable chronic cluster headache Improved  - butalbital-acetaminophen-caffeine (FIORICET, ESGIC) -40 MG per tablet; Take 1 tablet by mouth every 6 hours as needed for Migraine  Dispense: 10 tablet; Refill: 3    2. Anxiety  Continue  - escitalopram (LEXAPRO) 10 MG tablet; Take 1 tablet by mouth daily  Dispense: 30 tablet; Refill: 3  Take Vistaril as needed  3. Essential hypertension  Advised to start her on low-dose of lisinopril  - lisinopril (PRINIVIL;ZESTRIL) 10 MG tablet; Take 1 tablet by mouth daily  Dispense: 30 tablet; Refill: 1  Keep the log of the blood pressure  And update in 1 month and follow-up in 3 months  4. Acute cystitis without hematuria  - sulfamethoxazole-trimethoprim (BACTRIM DS) 800-160 MG per tablet; Take 1 tablet by mouth 2 times daily for 3 days  Dispense: 6 tablet; Refill: 0        Maximino Dancer is a 28 y.o. female being evaluated by a Virtual Visit (video visit) encounter to address concerns as mentioned above. A caregiver was present when appropriate. Due to this being a TeleHealth encounter ([facility]), evaluation of the following organ systems was limited: Vitals/Constitutional/EENT/Resp/CV/GI//MS/Neuro/Skin/Heme-Lymph-Imm. Pursuant to the emergency declaration under the [address] [place] [address], [address] [place] [address] and the [email or web address] and [employer] Act, this Virtual Visit was conducted with patient's (and/or legal guardian's) consent, to reduce the patient's risk of exposure to COVID-19 and provide necessary medical care. The patient (and/or legal guardian) has also been advised to contact this office for worsening conditions or problems, and seek emergency medical treatment and/or call 911 if deemed necessary. Patient identification was verified at the start of the visit: Yes    Total time spent on this encounter: 30 minutes. This encounter was not billed based on time.      Services were provided through a video synchronous discussion virtually to substitute for in-person clinic visit. Patient was located in their home. Provider was located in the office. --Nini Hagen MD on 5/17/2021 at 4:33 PM    An electronic signature was used to authenticate this note. Pratibha Miller

## 2021-05-18 RX ORDER — SUCRALFATE 1 G/1
TABLET ORAL
Qty: 60 TABLET | Refills: 0 | Status: SHIPPED | OUTPATIENT
Start: 2021-05-18 | End: 2021-06-10

## 2021-05-18 NOTE — TELEPHONE ENCOUNTER
Medication:   Requested Prescriptions     Pending Prescriptions Disp Refills    sucralfate (CARAFATE) 1 GM tablet [Pharmacy Med Name: SUCRALFATE 1 GM TABLET] 60 tablet 0     Sig: TAKE ONE TABLET BY MOUTH THREE TIMES A DAY        Last Filled:  4/29/2021 #60 Refills 0    Patient Phone Number: [phone] (home)     Last appt: 5/17/2021   Return in about 3 months (around 8/17/2021) for Hypertension. Next appt: Visit date not found    Last OARRS: No flowsheet data found.

## 2021-05-24 RX ORDER — FAMOTIDINE 20 MG/1
TABLET, FILM COATED ORAL
Qty: 120 TABLET | Refills: 0 | Status: SHIPPED | OUTPATIENT
Start: 2021-05-24 | End: 2021-07-27

## 2021-06-04 ENCOUNTER — TELEPHONE (OUTPATIENT)
Dept: FAMILY MEDICINE CLINIC | Age: 31
End: 2021-06-04

## 2021-06-09 ENCOUNTER — TELEPHONE (OUTPATIENT)
Dept: FAMILY MEDICINE CLINIC | Age: 31
End: 2021-06-09

## 2021-06-09 NOTE — TELEPHONE ENCOUNTER
----- Message from Roberto Barroso sent at 6/9/2021  4:35 PM EDT -----  Subject: Message to Provider    QUESTIONS  Information for Provider? Patient calling to schedule f/u bp med check   appt. Patient has questions prior to appt  ---------------------------------------------------------------------------  --------------  CALL BACK INFO  What is the best way for the office to contact you? OK to leave message on   voicemail  Preferred Call Back Phone Number? [phone]  ---------------------------------------------------------------------------  --------------  SCRIPT ANSWERS  Relationship to Patient? Self  Appointment reason? Well Care/Follow Ups  Select a Well Care/Follow Ups appointment reason? Adult Existing Condition   Follow Up [Diabetes, CHF, COPD, Hypertension/Blood Pressure Check]  (Is the patient requesting to be seen urgently for their symptoms?)? No  Is this follow up request related to routine Diabetes Management? No  Are you having any new concerns about your existing condition?  Yes

## 2021-06-10 ENCOUNTER — OFFICE VISIT (OUTPATIENT)
Dept: FAMILY MEDICINE CLINIC | Age: 31
End: 2021-06-10
Payer: COMMERCIAL

## 2021-06-10 VITALS
BODY MASS INDEX: 45.99 KG/M2 | SYSTOLIC BLOOD PRESSURE: 132 MMHG | WEIGHT: 293 LBS | HEART RATE: 97 BPM | HEIGHT: 67 IN | OXYGEN SATURATION: 99 % | DIASTOLIC BLOOD PRESSURE: 90 MMHG

## 2021-06-10 DIAGNOSIS — R56.9 SEIZURE (HCC): ICD-10-CM

## 2021-06-10 DIAGNOSIS — R43.2 ABNORMAL SENSE OF TASTE: ICD-10-CM

## 2021-06-10 DIAGNOSIS — F41.9 ANXIETY: ICD-10-CM

## 2021-06-10 DIAGNOSIS — I10 ESSENTIAL HYPERTENSION: ICD-10-CM

## 2021-06-10 DIAGNOSIS — K21.9 CHRONIC GERD: ICD-10-CM

## 2021-06-10 DIAGNOSIS — Z00.00 ANNUAL PHYSICAL EXAM: Primary | ICD-10-CM

## 2021-06-10 LAB
CONTROL: PRESENT
PREGNANCY TEST URINE, POC: NEGATIVE

## 2021-06-10 PROCEDURE — 1036F TOBACCO NON-USER: CPT | Performed by: FAMILY MEDICINE

## 2021-06-10 PROCEDURE — 99395 PREV VISIT EST AGE 18-39: CPT | Performed by: FAMILY MEDICINE

## 2021-06-10 PROCEDURE — 81025 URINE PREGNANCY TEST: CPT | Performed by: FAMILY MEDICINE

## 2021-06-10 PROCEDURE — G8417 CALC BMI ABV UP PARAM F/U: HCPCS | Performed by: FAMILY MEDICINE

## 2021-06-10 PROCEDURE — G8427 DOCREV CUR MEDS BY ELIG CLIN: HCPCS | Performed by: FAMILY MEDICINE

## 2021-06-10 PROCEDURE — 99213 OFFICE O/P EST LOW 20 MIN: CPT | Performed by: FAMILY MEDICINE

## 2021-06-10 RX ORDER — ESCITALOPRAM OXALATE 20 MG/1
20 TABLET ORAL DAILY
Qty: 30 TABLET | Refills: 5 | Status: SHIPPED | OUTPATIENT
Start: 2021-06-10

## 2021-06-10 NOTE — PROGRESS NOTES
Chief Complaint:   Milus Goodell is a 28 y.o. female who presents for complete physical examination. History of Present Illness:    F/u hypertension- BP has been high. 180/120 last week at work. Since then has been 069-615 systolic. Thinks BP cuff at home is reading high. Was in ER last weekend for BP. anxiety has been bad for the past few days with BP issues. Overall happy with lexapro but feels dose needs increased. Taste has been very sensitive lately. Last time she had that was when she was pregnant. Is sexually active but has mirena placed 3-4 years ago. Worried about pregnancy. GERD uncontolled on PPI and H2. Has seen GI in past, had EGD done. Has not followed up with them recently.        Past Medical History:   Diagnosis Date    Anemia     Anxiety     Breast cyst     pt. states she has condition that she gets lumps in breasts    Depression     Epilepsy ([address]     last gran mal age 4; focalized seizure of a limb 2/2021    Fibrocystic breast     Legally blind     left eye only    Ovarian tumor     benign    Pseudotumor cerebri     cerebri optic neuritis    Thyroid disease     hypo       Past Surgical History:   Procedure Laterality Date    BREAST LUMPECTOMY      BREAST SURGERY      lump removal    ESOPHAGEAL DILATATION N/A 4/2/2021    ESOPHAGEAL DILATION [place] performed by Dillon Harden MD at [address]  08/18/2017    with D&C   [facility] SURGERY Right 2011    right ovary and tube removed for benign tumor    UPPER GASTROINTESTINAL ENDOSCOPY N/A 4/2/2021    EGD BIOPSY performed by Dillon Harden MD at [address] Medications Marked as Taking for the 6/10/21 encounter (Office Visit) with Lucio Barrera MD   Medication Sig Dispense Refill    losartan-hydroCHLOROthiazide (HYZAAR) 50-12.5 MG per tablet Take 1 tablet by mouth daily 30 tablet 1    famotidine (PEPCID) 20 MG tablet TAKE ONE TABLET BY MOUTH TWICE A DAY 120 tablet 0    sucralfate (CARAFATE) 1 GM tablet TAKE ONE TABLET BY MOUTH THREE TIMES A DAY 60 tablet 0    butalbital-acetaminophen-caffeine (FIORICET, ESGIC) -40 MG per tablet Take 1 tablet by mouth every 6 hours as needed for Migraine 10 tablet 3    escitalopram (LEXAPRO) 10 MG tablet Take 1 tablet by mouth daily 30 tablet 3    lisinopril (PRINIVIL;ZESTRIL) 10 MG tablet Take 1 tablet by mouth daily 30 tablet 1    hydrOXYzine (ATARAX) 10 MG tablet Take 10 mg by mouth 3 times daily as needed for Itching      Nutritional Supplements (EQUATE PO) Take by mouth daily      Brivaracetam (BRIVIACT) 50 MG TABS Take by mouth nightly.       pantoprazole (PROTONIX) 40 MG tablet Take 1 tablet by mouth every morning (before breakfast) 30 tablet 2    vitamin D (ERGOCALCIFEROL) 1.25 MG (41876 UT) CAPS capsule Take 1 capsule by mouth once a week (Patient taking differently: Take 50,000 Units by mouth every 14 days ) 4 capsule 5     Allergies   Allergen Reactions    Food Rash     Mushrooms       Social History     Socioeconomic History    Marital status:      Spouse name: Not on file    Number of children: Not on file    Years of education: Not on file    Highest education level: Not on file   Occupational History    Not on file   Tobacco Use    Smoking status: Former Smoker     Packs/day: 1.00     Years: 9.00     Pack years: 9.00     Types: Cigarettes     Start date: 2017     Quit date: 2020     Years since quittin.5    Smokeless tobacco: Never Used   Vaping Use    Vaping Use: Never used   Substance and Sexual Activity    Alcohol use: No    Drug use: No    Sexual activity: Yes     Partners: Male   Other Topics Concern    Not on file   Social History Narrative    Not on file     Social Determinants of Health     Financial Resource Strain: Medium Risk    Difficulty of Paying Living Expenses: Somewhat hard   Food Insecurity: No Food Insecurity    Worried About Running Out of Food in the Last Year: Never true   [address] in the Last Year: Never true   Transportation Needs: No Transportation Needs    Lack of Transportation (Medical): No    Lack of Transportation (Non-Medical):  No   Physical Activity:     Days of Exercise per Week:     Minutes of Exercise per Session:    Stress:     Feeling of Stress :    Social Connections:     Frequency of Communication with Friends and Family:     Frequency of Social Gatherings with Friends and Family:     Attends Sabianism Services:     Active Member of Clubs or Organizations:     Attends Club or Organization Meetings:     Marital Status:    Intimate Partner Violence:     Fear of Current or Ex-Partner:     Emotionally Abused:     Physically Abused:     Sexually Abused:        Family History   Problem Relation Age of Onset    Arthritis Mother     Cancer Mother     High Blood Pressure Mother     Other Mother         PE    Cancer Father     Depression Father     Diabetes Father     Kidney Disease Father     Mental Illness Father     Vision Loss Sister         Sister is legally blind in her L eye    Learning Disabilities Sister     Mental Illness Sister     Birth Defects Brother     Heart Disease Brother     Heart Disease Paternal Uncle     Stroke Paternal [address] Maternal Grandmother     Cancer Maternal Grandfather     Cancer Paternal Grandmother     Heart Disease Paternal Grandmother     High Cholesterol Paternal Grandmother     Stroke Paternal Grandmother     Cancer Paternal Grandfather     Stroke Paternal Grandfather     Miscarriages / Stillbirths Paternal [facility]   Topic Date Due    Hepatitis C screen  Never done    Varicella vaccine (1 of 2 - 2-dose childhood series) Never done    TSH testing  09/03/2021    Cervical cancer screen  02/05/2022    Potassium monitoring  04/26/2022    Creatinine monitoring  04/26/2022    DTaP/Tdap/Td vaccine (2 - Td or Tdap) 03/04/2025    Flu vaccine  Completed    COVID-19 Vaccine  Completed    HIV screen  Completed    Hepatitis A vaccine  Aged Out    Hepatitis B vaccine  Aged Out    Hib vaccine  Aged Out    Meningococcal (ACWY) vaccine  Aged Out    Pneumococcal 0-64 years Vaccine  Aged Out         Review Of Systems:  Skin: no changing moles, abnormal pigmentation, rash, scaling, itching, masses, hair or nail changes  Eyes: no blurring, diplopia, or eye pain  Ears/Nose/Throat: no hearing loss, tinnitus, vertigo, nosebleed, nasal congestion, rhinorrhea, sore throat  Respiratory: no cough, pleuritic chest pain, dyspnea, or wheezing  Cardiovascular: no angina, SABA, orthopnea, PND, palpitations, or claudication  Gastrointestinal: no nausea, vomiting, diarrhea, constipation, bloating, or abdominal pain  Genitourinary: no urinary urgency, frequency, dysuria, nocturia, hesitancy, or incontinence  Musculoskeletal: no arthritis, arthralgia, myalgia, weakness, or morning stiffness  Neurologic: no paralysis, paresis, paresthesia, seizures, tremors, or headaches  Hematologic/Lymphatic/Immunologic: no anemia, abnormal bleeding/bruising, fever, chills, night sweats, swollen glands, or unexplained weight loss  Endocrine: no heat or cold intolerance and no polyphagia, polydipsia, or polyuria    PHYSICAL EXAMINATION:  BP (!) 132/90   Pulse 97   Ht 5' 7\" (1.702 m)   Wt (!) 356 lb (161.5 kg)   SpO2 99%   BMI 55.76 kg/m²   General appearance: healthy, alert, no distress  Skin: Skin color, texture, turgor normal. No rashes or suspicious lesions. No induration or tightening palpated. Head: Normocephalic. No masses, lesions, tenderness or abnormalities  Eyes: Conjunctivae/corneas clear. PERRL, EOM's intact. Ears: External ears normal. Canals clear. TM's clear bilaterally. Hearing grossly normal.  Nose/Sinuses: Nares normal.   Oropharynx: Lips, mucosa, and tongue normal. Teeth and gums normal. Oropharynx clear with no exudate seen.   Neck: Neck supple, and symmetric. No adenopathy. Thyroid symmetric, normal size, without nodule. Trachea is midline. Back: Back symmetric, no curvature. ROM normal. No CVA tenderness. Lungs: Good diaphragmatic excursion. Lungs clear to auscultation bilaterally. No retractions or use of accessory muscles. Heart: PMI is not displaced, and no thrill noted. Regular rate and rhythm, with no rub, murmur or gallop noted. Breasts: Exam deferred to OB/GYN  Abdomen: Abdomen soft, non-tender. BS normal. No masses, organomegaly. No hernia noted. Extremities: Extremities normal. No deformities, edema, or skin discoloration. No cyanosis or clubbing noted to the nails. Hands and feet were warm and well-perfused with palpable dorsalis pedis pulses bilaterally. Lymph: No lymphadenopathy of the neck or supraclavicular regions. Musculoskeletal: Spine ROM normal. Muscular strength intact. Neuro: Cranial nerves intact, gait normal. No focal weakness. Pelvic: Exam deferred to OB/GYN        ASSESSMENT/PLAN:  1. Annual physical exam  - Lipid Panel; Future    2. Anxiety  Uncontrolled  Increase lexapro to 20mg daily  Atarax PRN  - escitalopram (LEXAPRO) 20 MG tablet; Take 1 tablet by mouth daily  Dispense: 30 tablet; Refill: 5    3. Seizure ([address]  stable    4. Abnormal sense of taste  Pregnancy test negative. Pt reassured  - POCT urine pregnancy    5. Essential hypertension  High at home, ok here  Needs new larger BP cuff at home, pt instructed to monitor closely at home. Will increase hyzaar dose if needed    6. Chronic GERD  Continue PPI and H2 blocker  F/u w GI        All health maintenance issues were updated.   Recommend begin progressive daily aerobic exercise program, follow a low fat, low cholesterol diet, attempt to lose weight and reduce exposure to stress

## 2021-06-14 ENCOUNTER — HOSPITAL ENCOUNTER (OUTPATIENT)
Dept: NON INVASIVE DIAGNOSTICS | Age: 31
Discharge: HOME | End: 2021-06-14
Payer: COMMERCIAL

## 2021-06-14 ENCOUNTER — PATIENT MESSAGE (OUTPATIENT)
Dept: FAMILY MEDICINE CLINIC | Age: 31
End: 2021-06-14

## 2021-06-14 DIAGNOSIS — R07.9 CHEST PAIN, UNSPECIFIED TYPE: ICD-10-CM

## 2021-06-14 LAB
LV EF: 65 %
LVEF MODALITY: NORMAL

## 2021-06-14 PROCEDURE — 93320 DOPPLER ECHO COMPLETE: CPT

## 2021-06-14 PROCEDURE — 93351 STRESS TTE COMPLETE: CPT

## 2021-06-15 ENCOUNTER — TELEPHONE (OUTPATIENT)
Dept: FAMILY MEDICINE CLINIC | Age: 31
End: 2021-06-15

## 2021-06-15 NOTE — TELEPHONE ENCOUNTER
Please confirm if she too her medication for hypertension  Check the dose and time she took  And advise her to take vistaril once now and recheck the BP in 1 hour

## 2021-06-15 NOTE — TELEPHONE ENCOUNTER
Spoke with patient she has been taking her meds between 830- 900 pm, 50/12.5 mg. She understands to take Vistaril and re check her bp in an hour. Will call back with updates.

## 2021-06-16 NOTE — TELEPHONE ENCOUNTER
I called pt back and she states this morning it was 145/85. Do you want to see her in the office still? Please advise, thanks.

## 2021-06-18 NOTE — TELEPHONE ENCOUNTER
Please advise her to keep the log of the BP for one week and then if its consistently elevated 4 reading out of 10 then advise her to be seen in the office

## 2021-06-25 RX ORDER — ERGOCALCIFEROL 1.25 MG/1
CAPSULE ORAL
Qty: 4 CAPSULE | Refills: 4 | Status: SHIPPED | OUTPATIENT
Start: 2021-06-25 | End: 2021-11-26

## 2021-07-27 RX ORDER — FAMOTIDINE 20 MG/1
TABLET, FILM COATED ORAL
Qty: 120 TABLET | Refills: 0 | Status: SHIPPED | OUTPATIENT
Start: 2021-07-27 | End: 2021-09-23 | Stop reason: SDUPTHER

## 2021-07-27 NOTE — TELEPHONE ENCOUNTER
Medication:   Requested Prescriptions     Pending Prescriptions Disp Refills    famotidine (PEPCID) 20 MG tablet [Pharmacy Med Name: FAMOTIDINE 20 MG TABLET] 120 tablet 0     Sig: TAKE ONE TABLET BY MOUTH TWICE A DAY        Last Filled:  5/24/21 #120 R0    Patient Phone Number: [phone] (home)     Last appt: 6/10/2021   Next appt: Visit date not found    Last OARRS: No flowsheet data found.

## 2021-07-30 RX ORDER — LOSARTAN POTASSIUM AND HYDROCHLOROTHIAZIDE 12.5; 5 MG/1; MG/1
TABLET ORAL
Qty: 30 TABLET | Refills: 0 | Status: SHIPPED | OUTPATIENT
Start: 2021-07-30 | End: 2021-08-30

## 2021-07-30 NOTE — TELEPHONE ENCOUNTER
Medication:   Requested Prescriptions     Pending Prescriptions Disp Refills    losartan-hydroCHLOROthiazide (HYZAAR) 50-12.5 MG per tablet [Pharmacy Med Name: Jeanie Shaver 50-12.5 MG TAB] 30 tablet 0     Sig: TAKE ONE TABLET BY MOUTH DAILY        Last Filled:  6/4/21 #30 R1    Patient Phone Number: [phone] (home)     Last appt: 6/10/2021   Next appt: Visit date not found    Last OARRS: No flowsheet data found.

## 2021-08-30 RX ORDER — LOSARTAN POTASSIUM AND HYDROCHLOROTHIAZIDE 12.5; 5 MG/1; MG/1
TABLET ORAL
Qty: 30 TABLET | Refills: 0 | Status: SHIPPED | OUTPATIENT
Start: 2021-08-30

## 2021-08-30 NOTE — TELEPHONE ENCOUNTER
Medication:   Requested Prescriptions     Pending Prescriptions Disp Refills    losartan-hydroCHLOROthiazide (HYZAAR) 50-12.5 MG per tablet [Pharmacy Med Name: Matheus Mendes 50-12.5 MG TAB] 30 tablet 0     Sig: TAKE ONE TABLET BY MOUTH DAILY        Last Filled:  7/30/21 #30 R0    Patient Phone Number: [phone] (home)     Last appt: 6/10/2021   Next appt: Visit date not found    Last OARRS: No flowsheet data found.

## 2021-09-23 RX ORDER — FAMOTIDINE 20 MG/1
TABLET, FILM COATED ORAL
Qty: 120 TABLET | Refills: 0 | Status: SHIPPED | OUTPATIENT
Start: 2021-09-23 | End: 2021-11-26

## 2021-09-23 NOTE — TELEPHONE ENCOUNTER
Last OV 6/10/2021   Next OV Visit date not found    Requested Prescriptions     Pending Prescriptions Disp Refills    famotidine (PEPCID) 20 MG tablet 120 tablet 0     Sig: TAKE ONE TABLET BY MOUTH TWICE A DAY    last fill 7/27
Medication and Quantity requested:     famotidine (PEPCID) 20 MG tablet      120 tablet    Last Visit  6/10/2021    Pharmacy and phone number yes
normal for race

## 2021-10-27 DIAGNOSIS — K21.9 CHRONIC GERD: Primary | ICD-10-CM

## 2021-10-27 PROBLEM — N83.202 CYST OF LEFT OVARY: Status: ACTIVE | Noted: 2021-08-02

## 2021-10-27 PROBLEM — F17.200 CURRENT SMOKER: Status: RESOLVED | Noted: 2020-06-02 | Resolved: 2021-10-27

## 2021-10-27 PROBLEM — M54.6 ACUTE RIGHT-SIDED THORACIC BACK PAIN: Status: ACTIVE | Noted: 2021-08-02

## 2021-10-27 RX ORDER — PANTOPRAZOLE SODIUM 40 MG/1
TABLET, DELAYED RELEASE ORAL
Qty: 90 TABLET | Refills: 1 | Status: SHIPPED | OUTPATIENT
Start: 2021-10-27

## 2021-10-27 RX ORDER — METHOCARBAMOL 500 MG/1
500 TABLET, FILM COATED ORAL 4 TIMES DAILY
COMMUNITY
Start: 2021-08-02

## 2021-10-27 NOTE — TELEPHONE ENCOUNTER
Last OV 6/10/2021   Next OV Visit date not found    Requested Prescriptions     Pending Prescriptions Disp Refills    pantoprazole (PROTONIX) 40 MG tablet [Pharmacy Med Name: PANTOPRAZOLE SOD DR 40 MG TAB] 90 tablet 0     Sig: TAKE ONE TABLET BY MOUTH EVERY MORNING BEFORE BREAKFAST    last fill 4/2/21  pended

## 2021-10-28 NOTE — TELEPHONE ENCOUNTER
Spoke to patient, she states that she has a new pcp. Ended her care with Dr. Bren Solano.  No further action is needed

## 2021-11-26 RX ORDER — ERGOCALCIFEROL 1.25 MG/1
CAPSULE ORAL
Qty: 4 CAPSULE | Refills: 4 | Status: SHIPPED | OUTPATIENT
Start: 2021-11-26

## 2021-11-26 RX ORDER — FAMOTIDINE 20 MG/1
TABLET, FILM COATED ORAL
Qty: 120 TABLET | Refills: 0 | Status: SHIPPED | OUTPATIENT
Start: 2021-11-26 | End: 2022-01-26

## 2021-11-26 NOTE — TELEPHONE ENCOUNTER
Peripheral Nerve Block Procedure Note    Staff:     Anesthesiologist:  SHARON TURNER    Resident/CRNA:  GABY KINGSTON    Block performed by resident/CRNA in the presence of a teaching physician    Location: OR AFTER induction  Procedure Start/Stop TImes:      5/15/2017 8:40 AM     5/15/2017 8:40 AM    patient identified, IV checked, site marked, risks and benefits discussed, informed consent, monitors and equipment checked, pre-op evaluation, at physician/surgeon's request and post-op pain management      Correct Patient: Yes      Correct Position: Yes      Correct Site: Yes      Correct Procedure: Yes      Correct Laterality:  Yes    Site Marked:  Yes  Procedure details:     Procedure:  Paravertebral    ASA:  2    Diagnosis:  Post op pain    Laterality:  Bilateral    Position:  Right Lateral Decubitus    Sterile Prep: chloraprep, patient draped, mask and sterile gloves      Insertion Site:  T6-7    Needle:  Touhy needle    Needle gauge:  17    Catheter gauge:  19    Catheter threaded easily: Yes      Threaded to cm at skin:  9    Ultrasound: Yes      Ultrasound used to identify targeted nerve, plexus, or vascular structure and placed a needle adjacent to it      Permanent Image entered into patiient's record      Blood Aspirated: No      Bleeding at site: No      Test dose (mL):  4    Test dose local:   Lidocaine 1.5% w/ 1:200,000 epinephrine    Test dose time:  08:30    Test dose negative for signs of intravascular injection: Yes      Bolus via:  Catheter    Infusion Method:  Continuous Infusion    Blood aspirated via catheter: No      Secured:  Dermabond and Tegaderm    Complications:  None         Medication:   Requested Prescriptions     Pending Prescriptions Disp Refills    vitamin D (ERGOCALCIFEROL) 1.25 MG (93996 UT) CAPS capsule [Pharmacy Med Name: VIT D2 (ERGOCAL) 1.25MG(50,000U) CP] 4 capsule 4     Sig: TAKE ONE CAPSULE BY MOUTH ONCE WEEKLY     Last Filled:  6/25/21    Last appt: 6/10/2021   Next appt: Visit date not found    Last OARRS: No flowsheet data found.

## 2021-11-26 NOTE — TELEPHONE ENCOUNTER
Medication:   Requested Prescriptions     Pending Prescriptions Disp Refills    famotidine (PEPCID) 20 MG tablet [Pharmacy Med Name: FAMOTIDINE 20 MG TABLET] 120 tablet 0     Sig: TAKE ONE TABLET BY MOUTH TWICE A DAY     Last Filled 9/23/21    Last appt: 6/10/2021   Next appt: 11/26/2021    Last OARRS: No flowsheet data found.

## 2022-01-26 DIAGNOSIS — K21.9 CHRONIC GERD: Primary | ICD-10-CM

## 2022-01-26 RX ORDER — FAMOTIDINE 20 MG/1
TABLET, FILM COATED ORAL
Qty: 120 TABLET | Refills: 0 | Status: SHIPPED | OUTPATIENT
Start: 2022-01-26

## 2022-01-26 NOTE — TELEPHONE ENCOUNTER
Medication:   Requested Prescriptions     Pending Prescriptions Disp Refills    famotidine (PEPCID) 20 MG tablet [Pharmacy Med Name: FAMOTIDINE 20 MG TABLET] 120 tablet 0     Sig: TAKE ONE TABLET BY MOUTH TWICE A DAY     Last Filled: 11/26/21 #120 refills 0    Last appt: 6/10/2021   Next appt: Visit date not found    Last OARRS: No flowsheet data found.

## 2022-03-02 NOTE — TELEPHONE ENCOUNTER
Called pt - left message.     -  Dear Chema      Here are your labs which show a slightly higher hemoglobin A1c at 7.1.  The rest of your labs actually look pretty good.  Potentially, we can increase your Jardiance to 25 mg daily.  However, if you think there is room to change in your diet, that would also be reasonable and we can recheck labs in 6 months.    If you have any questions, please feel free to contact my nurse at [phone] select option #3 for triage nurse  or  option #1 for scheduling related questions.    Regards    Cheri Watson MD    Spoke to patient, she will call back and make an appointment if it worsens.  No further action is needed

## 2023-10-16 ENCOUNTER — TELEPHONE (OUTPATIENT)
Dept: CARDIOLOGY CLINIC | Age: 33
End: 2023-10-16

## 2023-10-16 NOTE — TELEPHONE ENCOUNTER
Called pt, pt will need to come into the office to see any cardiologist  for cardiac clearance for barbaric surgery.

## 2023-10-16 NOTE — TELEPHONE ENCOUNTER
Pt is at the end stage of getting ready to have bariatric surgery, pt had echo 2021. Pt stated they were told they do not need to follow a cardiologist at they need to watch their BP. Pt is wanting to know if they cn get clearance for surgery or do they need to come in the office for appt?     Pl advise thank you     [phone]

## 2023-10-17 ENCOUNTER — OFFICE VISIT (OUTPATIENT)
Dept: CARDIOLOGY CLINIC | Age: 33
End: 2023-10-17
Payer: COMMERCIAL

## 2023-10-17 VITALS
SYSTOLIC BLOOD PRESSURE: 130 MMHG | DIASTOLIC BLOOD PRESSURE: 84 MMHG | WEIGHT: 293 LBS | BODY MASS INDEX: 51.91 KG/M2 | HEIGHT: 63 IN | OXYGEN SATURATION: 95 % | HEART RATE: 87 BPM

## 2023-10-17 DIAGNOSIS — I10 ESSENTIAL HYPERTENSION: Primary | ICD-10-CM

## 2023-10-17 DIAGNOSIS — R56.9 SEIZURE (HCC): ICD-10-CM

## 2023-10-17 DIAGNOSIS — E07.9 THYROID DISEASE: ICD-10-CM

## 2023-10-17 DIAGNOSIS — E66.01 OBESITY, MORBID, BMI 50 OR HIGHER (HCC): ICD-10-CM

## 2023-10-17 DIAGNOSIS — M32.9 LUPUS (HCC): ICD-10-CM

## 2023-10-17 DIAGNOSIS — G47.33 OSA (OBSTRUCTIVE SLEEP APNEA): ICD-10-CM

## 2023-10-17 DIAGNOSIS — E28.2 PCOS (POLYCYSTIC OVARIAN SYNDROME): ICD-10-CM

## 2023-10-17 PROCEDURE — 1036F TOBACCO NON-USER: CPT | Performed by: INTERNAL MEDICINE

## 2023-10-17 PROCEDURE — G8484 FLU IMMUNIZE NO ADMIN: HCPCS | Performed by: INTERNAL MEDICINE

## 2023-10-17 PROCEDURE — 3079F DIAST BP 80-89 MM HG: CPT | Performed by: INTERNAL MEDICINE

## 2023-10-17 PROCEDURE — 3075F SYST BP GE 130 - 139MM HG: CPT | Performed by: INTERNAL MEDICINE

## 2023-10-17 PROCEDURE — 99203 OFFICE O/P NEW LOW 30 MIN: CPT | Performed by: INTERNAL MEDICINE

## 2023-10-17 PROCEDURE — 93000 ELECTROCARDIOGRAM COMPLETE: CPT | Performed by: INTERNAL MEDICINE

## 2023-10-17 PROCEDURE — G8417 CALC BMI ABV UP PARAM F/U: HCPCS | Performed by: INTERNAL MEDICINE

## 2023-10-17 PROCEDURE — G8427 DOCREV CUR MEDS BY ELIG CLIN: HCPCS | Performed by: INTERNAL MEDICINE

## 2023-10-17 RX ORDER — TACROLIMUS 0.3 MG/G
OINTMENT TOPICAL
COMMUNITY

## 2023-10-17 RX ORDER — ALBUTEROL SULFATE 90 UG/1
2 AEROSOL, METERED RESPIRATORY (INHALATION) EVERY 6 HOURS PRN
COMMUNITY
Start: 2023-09-11

## 2023-10-17 RX ORDER — FLUTICASONE PROPIONATE 50 MCG
SPRAY, SUSPENSION (ML) NASAL
COMMUNITY
Start: 2023-04-10

## 2023-10-17 RX ORDER — SPINOSAD 9 MG/ML
SUSPENSION TOPICAL
COMMUNITY

## 2023-10-17 RX ORDER — LOSARTAN POTASSIUM 100 MG/1
100 TABLET ORAL NIGHTLY
COMMUNITY

## 2023-10-17 NOTE — PATIENT INSTRUCTIONS
Follow up with Dr Jimenez Stands as needed     Okay to proceed with bariatric surgery. Call for any questions or concerns.

## 2023-10-17 NOTE — PROGRESS NOTES
[address]  [phone]  10/17/23  Referring: Dr. Wang Found CONSULT/CHIEF COMPLAINT/HPI     Reason for visit/ Chief complaint  Pre-Op Clearance   HPI Meme Holloway is a 29 y.o. new female patient here by referral from Dr Linda Min for Cardiac Risk Assessment. Patient will be having bariatric surgery. She is a former smoker of 1ppd, 12 yrs, quit 2020. She does not drink alcohol. She was diagnosed with Lupus in 2022. Her brother was born with congenital heart disease. No family with acquired heart disease at a young age. Today she denies any CP, SOB, palpitations or dizziness at this time. She has occasional SOB due to being overweight. Patient is adherent with medications and is tolerating them well without side effects     HISTORY/ALLERGIES/ROS     MedHx:  has a past medical history of Anemia, Anxiety, Breast cyst, Depression, Epilepsy ([address]), Fibrocystic breast, Hypertension, Legally blind, Ovarian tumor, Pseudotumor cerebri, Sleep apnea, and Thyroid disease. SurgHx:  has a past surgical history that includes Breast surgery; Ovary surgery (Right, 2011); laparotomy; Breast lumpectomy; hysteroscopy (08/18/2017); Upper gastrointestinal endoscopy (N/A, 4/2/2021); and Esophagus dilation (N/A, 4/2/2021). SocHx:  reports that she quit smoking about 2 years ago. Her smoking use included cigarettes. She started smoking about 6 years ago. She has a 9.00 pack-year smoking history. She has never used smokeless tobacco. She reports that she does not drink alcohol and does not use drugs.    FamHx: No family history of premature coronary artery disease, sudden death, or aneurysm  Her brother was born with tricuspid atresia, had a Fontan, and eventually had a heart transplant  Family History   Problem Relation Age of Onset    Arthritis Mother     Cancer Mother     High Blood Pressure Mother     Other Mother         PE    Cancer Father     Depression Father

## 2024-03-05 ENCOUNTER — OFFICE VISIT (OUTPATIENT)
Age: 34
End: 2024-03-05

## 2024-03-05 VITALS
SYSTOLIC BLOOD PRESSURE: 145 MMHG | DIASTOLIC BLOOD PRESSURE: 85 MMHG | HEART RATE: 77 BPM | WEIGHT: 293 LBS | OXYGEN SATURATION: 95 % | TEMPERATURE: 97.8 F | BODY MASS INDEX: 61.38 KG/M2

## 2024-03-05 DIAGNOSIS — R05.1 ACUTE COUGH: Primary | ICD-10-CM

## 2024-03-05 DIAGNOSIS — J01.90 ACUTE SINUSITIS, RECURRENCE NOT SPECIFIED, UNSPECIFIED LOCATION: ICD-10-CM

## 2024-03-05 LAB
Lab: NORMAL
PERFORMING INSTRUMENT: NORMAL
QC PASS/FAIL: NORMAL
SARS-COV-2, POC: NORMAL

## 2024-03-05 RX ORDER — AMOXICILLIN AND CLAVULANATE POTASSIUM 875; 125 MG/1; MG/1
1 TABLET, FILM COATED ORAL 2 TIMES DAILY
Qty: 14 TABLET | Refills: 0 | Status: SHIPPED | OUTPATIENT
Start: 2024-03-05 | End: 2024-03-12

## 2024-03-05 RX ORDER — OMEPRAZOLE 40 MG/1
CAPSULE, DELAYED RELEASE ORAL
COMMUNITY
Start: 2023-12-28

## 2024-03-05 RX ORDER — GUAIFENESIN AND DEXTROMETHORPHAN HYDROBROMIDE 600; 30 MG/1; MG/1
1 TABLET, EXTENDED RELEASE ORAL 2 TIMES DAILY
Qty: 20 TABLET | Refills: 0 | Status: SHIPPED | OUTPATIENT
Start: 2024-03-05

## 2024-03-05 ASSESSMENT — ENCOUNTER SYMPTOMS
BACK PAIN: 0
ABDOMINAL PAIN: 0
COUGH: 1
DIARRHEA: 0
EYE REDNESS: 0
SORE THROAT: 0
VOMITING: 0
SINUS PRESSURE: 1

## 2024-03-05 NOTE — PROGRESS NOTES
Zahida Ken (:  1990) is a 33 y.o. female,New patient, here for evaluation of the following chief complaint(s):  Congestion (Congestion, loss of sense of taste and smell, intermittent cough, sore throat x 5 days)      ASSESSMENT/PLAN:  1. Acute cough    - POCT COVID-19, Antigen    2. Acute sinusitis, recurrence not specified, unspecified location    - amoxicillin-clavulanate (AUGMENTIN) 875-125 MG per tablet; Take 1 tablet by mouth 2 times daily for 7 days  Dispense: 14 tablet; Refill: 0  - Dextromethorphan-guaiFENesin (MUCINEX DM)  MG TB12; Take 1 tablet by mouth 2 times daily  Dispense: 20 tablet; Refill: 0     -increase fluid intake,take Tylenol as needed  No follow-ups on file.    SUBJECTIVE/OBJECTIVE:  Pt c/o 5 day h/o uri symptoms,pt stated that she lost sense of smell and taste          Vitals:    24 0820   BP: (!) 145/85   Site: Right Lower Arm   Position: Sitting   Cuff Size: Large Adult   Pulse: 77   Temp: 97.8 °F (36.6 °C)   TempSrc: Oral   SpO2: 95%   Weight: (!) 154.7 kg (341 lb)       Review of Systems   Constitutional:  Negative for activity change, appetite change, fatigue and fever.   HENT:  Positive for congestion and sinus pressure. Negative for ear pain, sneezing and sore throat.    Eyes:  Negative for redness.   Respiratory:  Positive for cough (mild).    Cardiovascular:  Negative for chest pain.   Gastrointestinal:  Negative for abdominal pain, diarrhea and vomiting.   Musculoskeletal:  Negative for back pain.   Neurological:  Negative for dizziness and headaches.       Physical Exam  Constitutional:       General: She is not in acute distress.     Appearance: She is obese.   HENT:      Nose: Congestion (with sinus tenderness) present.      Mouth/Throat:      Mouth: Mucous membranes are moist.      Pharynx: No posterior oropharyngeal erythema.   Eyes:      Conjunctiva/sclera: Conjunctivae normal.      Pupils: Pupils are equal, round, and reactive to light.

## 2024-06-03 ENCOUNTER — OFFICE VISIT (OUTPATIENT)
Age: 34
End: 2024-06-03

## 2024-06-03 VITALS
HEART RATE: 73 BPM | SYSTOLIC BLOOD PRESSURE: 133 MMHG | BODY MASS INDEX: 47.09 KG/M2 | RESPIRATION RATE: 17 BRPM | OXYGEN SATURATION: 95 % | TEMPERATURE: 98.6 F | HEIGHT: 66 IN | WEIGHT: 293 LBS | DIASTOLIC BLOOD PRESSURE: 88 MMHG

## 2024-06-03 DIAGNOSIS — N30.00 ACUTE CYSTITIS WITHOUT HEMATURIA: Primary | ICD-10-CM

## 2024-06-03 RX ORDER — NITROFURANTOIN 25; 75 MG/1; MG/1
100 CAPSULE ORAL 2 TIMES DAILY
Qty: 10 CAPSULE | Refills: 0 | Status: SHIPPED | OUTPATIENT
Start: 2024-06-03 | End: 2024-06-08

## 2024-06-03 NOTE — PATIENT INSTRUCTIONS
Urine culture through [facility] is still pending.  Will begin on course of antibiotic due to persisting symptoms.  Follow up with [facility] about the culture in then next day.  They may adjust treatment is indicated.  Continue to stay hydrated and rested.  If fever, vomiting or worsening pain, return to ED for further evaluation.

## 2024-06-03 NOTE — PROGRESS NOTES
Zahida Ken (:  1990) is a 34 y.o. female,New patient, here for evaluation of the following chief complaint(s):  Urinary Tract Infection (Burning pressure urine cx was done at the ER and she can't wait she is in pain)      ASSESSMENT/PLAN:  Visit Diagnoses and Associated Orders       Acute cystitis without hematuria    -  Primary    nitrofurantoin, macrocrystal-monohydrate, (MACROBID) 100 MG capsule [[id number]]           ORDERS WITHOUT AN ASSOCIATED DIAGNOSIS    Brivaracetam 75 MG TABS [[id number]]             Urine culture through [facility] is still pending.  Will begin on course of antibiotic due to persisting symptoms.  Follow up with [facility] about the culture in then next day.  They may adjust treatment is indicated.  Continue to stay hydrated and rested.  If fever, vomiting or worsening pain, return to ED for further evaluation.      SUBJECTIVE/OBJECTIVE:    Zahida here tonight c/o persisting UTI symptoms.  She states seen past Friday at [facility] ED and labs with urine culture pending.  Was not started on treatment for infection.  UA reviewed and bacteria and leukocytes present.  Pt states dysuria and suprapubic abdominal pain persist.  Denies fever or chills.  Denies NVD.  Denies hematuria.  Admits lower back pain.      History provided by:  Patient   used: No        ROS: See HPI       Vitals:    24 1659   BP: 133/88   Site: Left Lower Arm   Position: Sitting   Cuff Size: Medium Adult   Pulse: 73   Resp: 17   Temp: 98.6 °F (37 °C)   SpO2: 95%   Weight: (!) 143.8 kg (317 lb)   Height: 1.664 m (5' 5.5\")       No results found for this visit on 24.     Physical Exam  Vitals and nursing note reviewed.   Constitutional:       General: She is not in acute distress.     Appearance: She is obese. She is not diaphoretic.   HENT:      Nose: Nose normal.      Mouth/Throat:      Mouth: Mucous membranes are moist.   Pulmonary:      Effort: Pulmonary effort is normal. No

## 2024-12-03 ENCOUNTER — OFFICE VISIT (OUTPATIENT)
Age: 34
End: 2024-12-03

## 2024-12-03 VITALS
HEART RATE: 72 BPM | WEIGHT: 293 LBS | SYSTOLIC BLOOD PRESSURE: 130 MMHG | TEMPERATURE: 97.8 F | BODY MASS INDEX: 50.02 KG/M2 | OXYGEN SATURATION: 94 % | DIASTOLIC BLOOD PRESSURE: 85 MMHG | HEIGHT: 64 IN

## 2024-12-03 DIAGNOSIS — R52 BODY ACHES: ICD-10-CM

## 2024-12-03 DIAGNOSIS — J02.9 SORE THROAT: Primary | ICD-10-CM

## 2024-12-03 LAB
INFLUENZA VIRUS A RNA: NEGATIVE
INFLUENZA VIRUS B RNA: NEGATIVE
Lab: NORMAL
PERFORMING INSTRUMENT: NORMAL
QC PASS/FAIL: NORMAL
SARS-COV-2, POC: NORMAL
STREPTOCOCCUS A RNA: NEGATIVE

## 2024-12-03 ASSESSMENT — ENCOUNTER SYMPTOMS: SORE THROAT: 1

## 2024-12-03 NOTE — PROGRESS NOTES
Zahida Ken (:  1990) is a 34 y.o. female,Established patient, here for evaluation of the following chief complaint(s):  Pharyngitis, Congestion, Headache, and Ear Pain (Pt states pooping in right ear. This all going on for two days. )      ASSESSMENT/PLAN:    ICD-10-CM    1. Sore throat  J02.9 POCT Rapid Strep A DNA ([facility] i)      2. Body aches  R52 POCT COVID-19, Antigen        Results for orders placed or performed in visit on 24   POCT COVID-19, Antigen   Result Value Ref Range    SARS-COV-2, POC Not-Detected Not Detected    Lot Number [id number]     QC Pass/Fail pass     Performing Instrument BinaxNOW    POCT Rapid Strep A DNA ([facility] i)   Result Value Ref Range    Streptococcus A RNA negative    POCT Influenza A/B DNA ([facility] i)   Result Value Ref Range    Influenza virus A RNA negative     Influenza virus B RNA negative       SARS-CoV-2, PCR (no units)   Date Value   2021 Not Detected     SARS-COV-2, POC (no units)   Date Value   2024 Not-Detected      Tylenol or ibuprofen for pain prn  Discussed symptomatic treatments: Cepacol lozenges, salt water gargles, cold drinks to ease sore throat.    Follow up in 2-3 days if symptoms persist or if symptoms worsen.    SUBJECTIVE/OBJECTIVE:  HPI  HPI:   34 y.o. female presents with symptoms of SORE THROAT, HEADACHE, BODYACHES, CONGESTION AND BILA EAR PAIN X 2 DAYS. SHE HAS BEEN GOING THROUGH TOUGH TIME, HER BROTHER RECENTLY PASSED AWAY. Denies FEVER. Has taken NOTHING for symptoms.  .LA  Vitals:    24 1254   BP: 130/85   Site: Left Upper Arm   Position: Sitting   Cuff Size: Small Adult   Pulse: 72   Temp: 97.8 °F (36.6 °C)   TempSrc: Oral   SpO2: 94%   Weight: (!) 138.3 kg (305 lb)   Height: 1.626 m (5' 4\")       Review of Systems   Constitutional:  Positive for chills and fatigue.   HENT:  Positive for congestion, ear pain and sore throat.    Musculoskeletal:  Positive for myalgias.   All other systems reviewed and are

## 2024-12-03 NOTE — PATIENT INSTRUCTIONS
Discussed symptomatic treatments: Cepacol lozenges, salt water gargles, cold drinks to ease sore throat.

## 2024-12-06 ENCOUNTER — OFFICE VISIT (OUTPATIENT)
Age: 34
End: 2024-12-06

## 2024-12-06 VITALS
SYSTOLIC BLOOD PRESSURE: 136 MMHG | BODY MASS INDEX: 50.02 KG/M2 | OXYGEN SATURATION: 94 % | HEART RATE: 67 BPM | HEIGHT: 64 IN | TEMPERATURE: 97.5 F | WEIGHT: 293 LBS | DIASTOLIC BLOOD PRESSURE: 87 MMHG

## 2024-12-06 DIAGNOSIS — J40 BRONCHITIS: Primary | ICD-10-CM

## 2024-12-06 DIAGNOSIS — J01.10 ACUTE NON-RECURRENT FRONTAL SINUSITIS: ICD-10-CM

## 2024-12-06 DIAGNOSIS — R05.1 ACUTE COUGH: ICD-10-CM

## 2024-12-06 RX ORDER — PREDNISONE 20 MG/1
60 TABLET ORAL DAILY
Qty: 15 TABLET | Refills: 0 | Status: SHIPPED | OUTPATIENT
Start: 2024-12-06 | End: 2024-12-11

## 2024-12-06 RX ORDER — CEFDINIR 300 MG/1
600 CAPSULE ORAL DAILY
Qty: 14 CAPSULE | Refills: 0 | Status: SHIPPED | OUTPATIENT
Start: 2024-12-06 | End: 2024-12-13

## 2024-12-06 RX ORDER — BROMPHENIRAMINE MALEATE, PSEUDOEPHEDRINE HYDROCHLORIDE, AND DEXTROMETHORPHAN HYDROBROMIDE 2; 30; 10 MG/5ML; MG/5ML; MG/5ML
5 SYRUP ORAL
Qty: 473 ML | Refills: 0 | Status: SHIPPED | OUTPATIENT
Start: 2024-12-06

## 2024-12-06 NOTE — PATIENT INSTRUCTIONS
Take antibiotics until completed even if feeling better to prevent re-infection.   Take prednisone daily in AM for 5 days which will help with inflammation in the lungs   Take cough medication every 4-6 hours as needed.  Increase fluids to help thin mucus, especially electrolyte-infused fluids to help rehydrate your body as respiratory infections can dehydrate you (gatorade zero, propel, vitamin water, liquid IV, smart water, life water, etc.)  Can take tylenol for fever/pain.   Can use nasal saline and/or flonase to help with nasal congestion.    You can use antihistamines like claritin (loratadine), zyrtec (cetirizine), allegra (fexofenadine) daily to help with any allergy symptoms like runny nose, stuffy nose, sneezing

## 2024-12-06 NOTE — PROGRESS NOTES
Zahida Ken (:  1990) is a 34 y.o. female,Established patient, here for evaluation of the following chief complaint(s):  Cold Symptoms (Pt reports symptoms are not going away still don't feel good. )         Assessment & Plan  Bronchitis     Take antibiotics until completed even if feeling better to prevent re-infection.   Take prednisone daily in AM for 5 days which will help with inflammation in the lungs   Take cough medication every 4-6 hours as needed.  Increase fluids to help thin mucus, especially electrolyte-infused fluids to help rehydrate your body as respiratory infections can dehydrate you (gatorade zero, propel, vitamin water, liquid IV, smart water, life water, etc.)  Can take tylenol for fever/pain.   Can use nasal saline and/or flonase to help with nasal congestion.    You can use antihistamines like claritin (loratadine), zyrtec (cetirizine), allegra (fexofenadine) daily to help with any allergy symptoms like runny nose, stuffy nose, sneezing     Orders:    predniSONE (DELTASONE) 20 MG tablet; Take 3 tablets by mouth daily for 5 days    Acute non-recurrent frontal sinusitis            Acute cough       Orders:    brompheniramine-pseudoephedrine-DM 2-30-10 MG/5ML syrup; Take 5 mLs by mouth every 4-6 hours as needed for Cough or Congestion      No follow-ups on file.       Subjective   Pt in to clinic for nasal congestion, runny nose, sinus pressure, dry cough worse at time, postnasal drip, fatigue, sore throat, ear pain \"popping\", headache, body aches, chill/sweats, for 5 days  Has taken tylenol and zicam without relief        History provided by:  Patient  Cold Symptoms   Associated symptoms include congestion, coughing, ear pain, headaches, rhinorrhea and a sore throat.       Review of Systems   Constitutional:  Positive for chills and fatigue. Negative for fever.   HENT:  Positive for congestion, ear pain, postnasal drip, rhinorrhea, sinus pressure and sore throat. Negative for

## 2024-12-14 ASSESSMENT — ENCOUNTER SYMPTOMS
SORE THROAT: 1
COUGH: 1
TROUBLE SWALLOWING: 0
RHINORRHEA: 1
SINUS PRESSURE: 1

## 2024-12-27 ENCOUNTER — OFFICE VISIT (OUTPATIENT)
Age: 34
End: 2024-12-27

## 2024-12-27 VITALS
DIASTOLIC BLOOD PRESSURE: 83 MMHG | HEIGHT: 65 IN | WEIGHT: 293 LBS | TEMPERATURE: 98 F | HEART RATE: 80 BPM | BODY MASS INDEX: 48.82 KG/M2 | SYSTOLIC BLOOD PRESSURE: 129 MMHG | OXYGEN SATURATION: 95 %

## 2024-12-27 DIAGNOSIS — J40 BRONCHITIS: Primary | ICD-10-CM

## 2024-12-27 RX ORDER — AZITHROMYCIN 250 MG/1
TABLET, FILM COATED ORAL
Qty: 6 TABLET | Refills: 0 | Status: SHIPPED | OUTPATIENT
Start: 2024-12-27 | End: 2025-01-06

## 2024-12-27 RX ORDER — BENZONATATE 100 MG/1
100 CAPSULE ORAL 3 TIMES DAILY PRN
Qty: 30 CAPSULE | Refills: 0 | Status: SHIPPED | OUTPATIENT
Start: 2024-12-27 | End: 2025-01-06

## 2024-12-27 RX ORDER — METHYLPREDNISOLONE 4 MG/1
TABLET ORAL
Qty: 21 TABLET | Refills: 0 | Status: SHIPPED | OUTPATIENT
Start: 2024-12-27 | End: 2025-01-02

## 2024-12-27 ASSESSMENT — ENCOUNTER SYMPTOMS: COUGH: 1

## 2024-12-27 NOTE — PROGRESS NOTES
Zahida Ken (:  1990) is a 34 y.o. female,Established patient, here for evaluation of the following chief complaint(s):  Cough (Patient c/o cough, congestion and feeling fatigue x2 days. Hx of Bronchitis )      ASSESSMENT/PLAN:  1. Bronchitis  - azithromycin (ZITHROMAX) 250 MG tablet; 500mg on day 1 followed by 250mg on days 2 - 5  Dispense: 6 tablet; Refill: 0  - benzonatate (TESSALON) 100 MG capsule; Take 1 capsule by mouth 3 times daily as needed for Cough  Dispense: 30 capsule; Refill: 0  - methylPREDNISolone (MEDROL DOSEPACK) 4 MG tablet; Take by mouth.  Dispense: 21 tablet; Refill: 0       Return if symptoms worsen or fail to improve.    SUBJECTIVE/OBJECTIVE:  PRESENT TO CLINIC WITH COUGH FOR 5 DAYS, NO FEVER, DRY COUGH        History provided by:  Patient  Cough        Vitals:    24 1335   BP: 129/83   Pulse: 80   Temp: 98 °F (36.7 °C)   TempSrc: Oral   SpO2: 95%   Weight: (!) 141.1 kg (311 lb)   Height: 1.651 m (5' 5\")       Review of Systems   Respiratory:  Positive for cough.        Physical Exam  Constitutional:       Appearance: Normal appearance.   HENT:      Head: Normocephalic and atraumatic.      Nose: Nose normal.      Mouth/Throat:      Mouth: Mucous membranes are moist.   Eyes:      Pupils: Pupils are equal, round, and reactive to light.   Pulmonary:      Effort: Pulmonary effort is normal.      Breath sounds: Normal breath sounds.   Musculoskeletal:         General: Normal range of motion.      Cervical back: Normal range of motion and neck supple.   Skin:     General: Skin is warm.   Neurological:      Mental Status: She is alert.   Psychiatric:         Mood and Affect: Mood normal.         Behavior: Behavior normal.           An electronic signature was used to authenticate this note.    --Kaylan Kee DO

## 2025-01-20 ENCOUNTER — OFFICE VISIT (OUTPATIENT)
Age: 35
End: 2025-01-20

## 2025-01-20 VITALS
WEIGHT: 293 LBS | HEART RATE: 75 BPM | HEIGHT: 64 IN | BODY MASS INDEX: 50.02 KG/M2 | SYSTOLIC BLOOD PRESSURE: 128 MMHG | TEMPERATURE: 98.3 F | DIASTOLIC BLOOD PRESSURE: 84 MMHG | OXYGEN SATURATION: 97 %

## 2025-01-20 DIAGNOSIS — N30.01 ACUTE CYSTITIS WITH HEMATURIA: Primary | ICD-10-CM

## 2025-01-20 LAB
BILIRUBIN, POC: NEGATIVE
BLOOD URINE, POC: ABNORMAL
CLARITY, POC: ABNORMAL
COLOR, POC: ABNORMAL
GLUCOSE URINE, POC: ABNORMAL MG/DL
KETONES, POC: NEGATIVE MG/DL
LEUKOCYTE EST, POC: ABNORMAL
NITRITE, POC: POSITIVE
PH, POC: 7
PROTEIN, POC: ABNORMAL MG/DL
SPECIFIC GRAVITY, POC: 1.02
UROBILINOGEN, POC: 2 MG/DL

## 2025-01-20 RX ORDER — PHENAZOPYRIDINE HYDROCHLORIDE 100 MG/1
100 TABLET, FILM COATED ORAL 3 TIMES DAILY PRN
Qty: 9 TABLET | Refills: 0 | Status: SHIPPED | OUTPATIENT
Start: 2025-01-20 | End: 2025-01-23

## 2025-01-20 RX ORDER — NITROFURANTOIN 25; 75 MG/1; MG/1
100 CAPSULE ORAL 2 TIMES DAILY
Qty: 10 CAPSULE | Refills: 0 | Status: SHIPPED | OUTPATIENT
Start: 2025-01-20 | End: 2025-01-25

## 2025-01-20 ASSESSMENT — ENCOUNTER SYMPTOMS
COUGH: 0
GASTROINTESTINAL NEGATIVE: 1
WHEEZING: 0
CHEST TIGHTNESS: 0
SHORTNESS OF BREATH: 0

## 2025-01-20 NOTE — PROGRESS NOTES
2025     Zahida Ken (:  1990) is a 34 y.o. female, here for evaluation of the following medical concerns:    Chief Complaint   Patient presents with    Urinary Urgency     Patient c/o urgency, frequency and Dysuria x2 days    Dysuria       Dysuria   This is a new problem. The current episode started yesterday. The problem occurs every urination. The problem has been gradually worsening. The quality of the pain is described as burning. The pain is at a severity of 5/10. The pain is moderate. There has been no fever. There is No history of pyelonephritis. Associated symptoms include frequency, hematuria and urgency. Pertinent negatives include no chills. She has tried nothing for the symptoms. The treatment provided no relief. Her past medical history is significant for recurrent UTIs.       Review of Systems   Constitutional:  Negative for chills, diaphoresis, fatigue and fever.   Respiratory:  Negative for cough, chest tightness, shortness of breath and wheezing.    Cardiovascular:  Negative for chest pain and palpitations.   Gastrointestinal: Negative.    Genitourinary:  Positive for dysuria, frequency, hematuria and urgency.   Neurological:  Negative for dizziness, weakness and headaches.       Prior to Visit Medications    Medication Sig Taking? Authorizing Provider   nitrofurantoin, macrocrystal-monohydrate, (MACROBID) 100 MG capsule Take 1 capsule by mouth 2 times daily for 5 days Yes Yvonne Capps APRN - CNP   phenazopyridine (PYRIDIUM) 100 MG tablet Take 1 tablet by mouth 3 times daily as needed for Pain Yes Yvonne Capps APRN - CNP   brompheniramine-pseudoephedrine-DM 2-30-10 MG/5ML syrup Take 5 mLs by mouth every 4-6 hours as needed for Cough or Congestion Yes Essence Terry APRN - CNP   omeprazole (PRILOSEC) 40 MG delayed release capsule  Yes Provider, MD Tamika   albuterol sulfate HFA (PROVENTIL;VENTOLIN;PROAIR) 108 (90 Base) MCG/ACT inhaler Inhale 2 puffs into the

## 2025-01-24 LAB
BACTERIA UR CULT: ABNORMAL
BACTERIA UR CULT: ABNORMAL
ORGANISM: ABNORMAL
ORGANISM: ABNORMAL

## 2025-01-30 ENCOUNTER — TELEPHONE (OUTPATIENT)
Age: 35
End: 2025-01-30

## 2025-01-30 DIAGNOSIS — B37.31 VAGINAL YEAST INFECTION: ICD-10-CM

## 2025-01-30 DIAGNOSIS — N30.00 ACUTE CYSTITIS WITHOUT HEMATURIA: Primary | ICD-10-CM

## 2025-01-30 RX ORDER — FLUCONAZOLE 150 MG/1
150 TABLET ORAL
Qty: 2 TABLET | Refills: 0 | Status: SHIPPED | OUTPATIENT
Start: 2025-01-30 | End: 2025-02-05

## 2025-01-30 RX ORDER — SULFAMETHOXAZOLE AND TRIMETHOPRIM 800; 160 MG/1; MG/1
1 TABLET ORAL 2 TIMES DAILY
Qty: 6 TABLET | Refills: 0 | Status: SHIPPED | OUTPATIENT
Start: 2025-01-30 | End: 2025-02-02

## 2025-01-30 NOTE — TELEPHONE ENCOUNTER
Spoke to Zahida about urine culture results. She completed her Macrobid. However one of the bacteria in her urine culture is resistant to Macrobid. Discussed with Zahida and Bactrim DS will be sent to her pharmacy. Also concerned about vaginal yeast infections as this is the third antibiotic she's been on recently. Fluconazole sent to her pharmacy.

## 2025-02-24 ENCOUNTER — OFFICE VISIT (OUTPATIENT)
Age: 35
End: 2025-02-24

## 2025-02-24 VITALS
WEIGHT: 293 LBS | BODY MASS INDEX: 48.82 KG/M2 | SYSTOLIC BLOOD PRESSURE: 121 MMHG | HEART RATE: 82 BPM | OXYGEN SATURATION: 96 % | DIASTOLIC BLOOD PRESSURE: 88 MMHG | HEIGHT: 65 IN | TEMPERATURE: 97.8 F

## 2025-02-24 DIAGNOSIS — H65.03 NON-RECURRENT ACUTE SEROUS OTITIS MEDIA OF BOTH EARS: ICD-10-CM

## 2025-02-24 DIAGNOSIS — J02.9 SORE THROAT: ICD-10-CM

## 2025-02-24 DIAGNOSIS — J00 NASOPHARYNGITIS: Primary | ICD-10-CM

## 2025-02-24 LAB
INFLUENZA A ANTIGEN, POC: NEGATIVE
INFLUENZA B ANTIGEN, POC: NEGATIVE
Lab: NORMAL
PERFORMING INSTRUMENT: NORMAL
QC PASS/FAIL: NORMAL
S PYO AG THROAT QL: NORMAL
SARS-COV-2, POC: NORMAL

## 2025-02-24 RX ORDER — PREDNISONE 10 MG/1
10 TABLET ORAL 2 TIMES DAILY
Qty: 6 TABLET | Refills: 0 | Status: SHIPPED | OUTPATIENT
Start: 2025-02-24 | End: 2025-02-27

## 2025-02-24 ASSESSMENT — ENCOUNTER SYMPTOMS
DIARRHEA: 0
COUGH: 0
SORE THROAT: 1
SINUS PAIN: 0
SHORTNESS OF BREATH: 0
NAUSEA: 0
VOMITING: 0
ABDOMINAL PAIN: 0
SINUS PRESSURE: 0
TROUBLE SWALLOWING: 0

## 2025-02-24 NOTE — PROGRESS NOTES
Zahida Ken (:  1990) is a 34 y.o. female,Established patient, here for evaluation of the following chief complaint(s):  Pharyngitis (Starting last night ) and Nausea (Starting today)      ASSESSMENT/PLAN:    ICD-10-CM    1. Nasopharyngitis  J00       2. Sore throat  J02.9 POCT COVID-19, Antigen     POCT Influenza A/B Antigen     POCT rapid strep A      3. Non-recurrent acute serous otitis media of both ears  H65.03 predniSONE (DELTASONE) 10 MG tablet          Negative rapid tests.  F/u with PCP.  Steroid for fluid on ears.   Er precautions    SUBJECTIVE/OBJECTIVE:    History provided by:  Patient   used: No    Pharyngitis  Associated symptoms: congestion and sore throat    Associated symptoms: no abdominal pain, no cough, no diarrhea, no ear pain, no fatigue, no fever, no headaches, no myalgias, no nausea, no rash, no shortness of breath and no vomiting      HPI:   34 y.o. female presents with symptoms of nausea and sore throat ongoing since last night. Denies fever, n/v/d, asthma hx. Has been around someone with flu a.. Has taken nothing for symptoms so far.     Vitals:    25 1557   BP: 121/88   Site: Right Upper Arm   Position: Sitting   Cuff Size: Large Adult   Pulse: 82   Temp: 97.8 °F (36.6 °C)   TempSrc: Oral   SpO2: 96%   Weight: (!) 138.6 kg (305 lb 9.6 oz)   Height: 1.638 m (5' 4.5\")       Review of Systems   Constitutional:  Negative for fatigue and fever.   HENT:  Positive for congestion and sore throat. Negative for ear pain, sinus pressure, sinus pain and trouble swallowing.    Respiratory:  Negative for cough and shortness of breath.    Gastrointestinal:  Negative for abdominal pain, diarrhea, nausea and vomiting.   Genitourinary:  Negative for decreased urine volume.   Musculoskeletal:  Negative for myalgias.   Skin:  Negative for rash.   Neurological:  Negative for dizziness, numbness and headaches.       Physical Exam  Constitutional:       General: She is

## 2025-05-25 ENCOUNTER — OFFICE VISIT (OUTPATIENT)
Age: 35
End: 2025-05-25

## 2025-05-25 VITALS
DIASTOLIC BLOOD PRESSURE: 68 MMHG | HEART RATE: 84 BPM | WEIGHT: 293 LBS | HEIGHT: 66 IN | SYSTOLIC BLOOD PRESSURE: 109 MMHG | BODY MASS INDEX: 47.09 KG/M2 | RESPIRATION RATE: 18 BRPM | OXYGEN SATURATION: 95 % | TEMPERATURE: 98.4 F

## 2025-05-25 DIAGNOSIS — N30.01 ACUTE CYSTITIS WITH HEMATURIA: Primary | ICD-10-CM

## 2025-05-25 DIAGNOSIS — R39.89 SUSPECTED UTI: ICD-10-CM

## 2025-05-25 LAB
BILIRUBIN, POC: NEGATIVE
BLOOD URINE, POC: NEGATIVE
CLARITY, POC: CLEAR
COLOR, POC: YELLOW
GLUCOSE URINE, POC: 100 MG/DL
KETONES, POC: ABNORMAL MG/DL
LEUKOCYTE EST, POC: ABNORMAL
NITRITE, POC: POSITIVE
PH, POC: 5
PROTEIN, POC: 30 MG/DL
SPECIFIC GRAVITY, POC: <=1.005
UROBILINOGEN, POC: 2 MG/DL

## 2025-05-25 RX ORDER — CEPHALEXIN 500 MG/1
500 CAPSULE ORAL 2 TIMES DAILY
Qty: 14 CAPSULE | Refills: 0 | Status: SHIPPED | OUTPATIENT
Start: 2025-05-25 | End: 2025-06-01

## 2025-05-27 LAB
BACTERIA UR CULT: ABNORMAL
ORGANISM: ABNORMAL

## 2025-05-29 ENCOUNTER — RESULTS FOLLOW-UP (OUTPATIENT)
Age: 35
End: 2025-05-29

## 2025-08-22 ENCOUNTER — OFFICE VISIT (OUTPATIENT)
Age: 35
End: 2025-08-22

## 2025-08-22 VITALS
WEIGHT: 293 LBS | SYSTOLIC BLOOD PRESSURE: 122 MMHG | DIASTOLIC BLOOD PRESSURE: 82 MMHG | HEART RATE: 73 BPM | TEMPERATURE: 98.8 F | BODY MASS INDEX: 47.09 KG/M2 | HEIGHT: 66 IN | OXYGEN SATURATION: 96 %

## 2025-08-22 DIAGNOSIS — J02.9 SORE THROAT: ICD-10-CM

## 2025-08-22 DIAGNOSIS — Z20.822 EXPOSURE TO COVID-19 VIRUS: ICD-10-CM

## 2025-08-22 DIAGNOSIS — J06.9 UPPER RESPIRATORY INFECTION, VIRAL: Primary | ICD-10-CM

## 2025-08-22 LAB
Lab: NORMAL
PERFORMING INSTRUMENT: NORMAL
QC PASS/FAIL: NORMAL
SARS-COV-2, POC: NORMAL

## 2025-08-22 RX ORDER — DEXTROMETHORPHAN HYDROBROMIDE, GUAIFENESIN AND PSEUDOEPHEDRINE HYDROCHLORIDE 15; 400; 60 MG/1; MG/1; MG/1
1 TABLET ORAL EVERY 6 HOURS
Qty: 28 TABLET | Refills: 0 | Status: SHIPPED | OUTPATIENT
Start: 2025-08-22 | End: 2025-08-29

## 2025-08-22 ASSESSMENT — ENCOUNTER SYMPTOMS
ABDOMINAL PAIN: 0
COUGH: 0
SINUS PAIN: 0
FACIAL SWELLING: 0
RESPIRATORY NEGATIVE: 1
WHEEZING: 0
SORE THROAT: 1
EYES NEGATIVE: 1
SHORTNESS OF BREATH: 0
DIARRHEA: 0
GASTROINTESTINAL NEGATIVE: 1
EYE PAIN: 0
VOMITING: 0
TROUBLE SWALLOWING: 0
VOICE CHANGE: 0
NAUSEA: 0
RHINORRHEA: 1

## (undated) DEVICE — BOUGIE SURG 54FR TIP L18MM SIL TAPR MLNY TIP TUNGSTEN FILL

## (undated) DEVICE — MASK CAPNOGRAPHY AD W35IN DIA58IN SAMP LN L10FT O2 LN

## (undated) DEVICE — FORCEPS BX L240CM JAW DIA2.8MM L CAP W/ NDL MIC MESH TOOTH